# Patient Record
Sex: FEMALE | Race: WHITE | Employment: UNEMPLOYED | URBAN - METROPOLITAN AREA
[De-identification: names, ages, dates, MRNs, and addresses within clinical notes are randomized per-mention and may not be internally consistent; named-entity substitution may affect disease eponyms.]

---

## 2017-03-27 ENCOUNTER — HOSPITAL ENCOUNTER (OUTPATIENT)
Dept: WOMENS IMAGING | Age: 47
Discharge: HOME | End: 2017-03-27
Payer: COMMERCIAL

## 2017-03-27 DIAGNOSIS — Z12.39 BREAST SCREENING: ICD-10-CM

## 2017-03-27 PROCEDURE — 77063 BREAST TOMOSYNTHESIS BI: CPT

## 2017-10-24 ENCOUNTER — OFFICE VISIT (OUTPATIENT)
Dept: PULMONOLOGY | Age: 47
End: 2017-10-24
Payer: COMMERCIAL

## 2017-10-24 VITALS
BODY MASS INDEX: 41.99 KG/M2 | SYSTOLIC BLOOD PRESSURE: 144 MMHG | DIASTOLIC BLOOD PRESSURE: 82 MMHG | WEIGHT: 237 LBS | RESPIRATION RATE: 20 BRPM | HEIGHT: 63 IN | OXYGEN SATURATION: 98 % | HEART RATE: 92 BPM

## 2017-10-24 DIAGNOSIS — G47.33 OBSTRUCTIVE SLEEP APNEA: Primary | ICD-10-CM

## 2017-10-24 PROCEDURE — G8417 CALC BMI ABV UP PARAM F/U: HCPCS | Performed by: INTERNAL MEDICINE

## 2017-10-24 PROCEDURE — G8427 DOCREV CUR MEDS BY ELIG CLIN: HCPCS | Performed by: INTERNAL MEDICINE

## 2017-10-24 PROCEDURE — 99204 OFFICE O/P NEW MOD 45 MIN: CPT | Performed by: INTERNAL MEDICINE

## 2017-10-24 PROCEDURE — G8484 FLU IMMUNIZE NO ADMIN: HCPCS | Performed by: INTERNAL MEDICINE

## 2017-10-24 PROCEDURE — 1036F TOBACCO NON-USER: CPT | Performed by: INTERNAL MEDICINE

## 2017-10-24 ASSESSMENT — SLEEP AND FATIGUE QUESTIONNAIRES
HOW LIKELY ARE YOU TO NOD OFF OR FALL ASLEEP WHILE SITTING AND TALKING TO SOMEONE: 0
HOW LIKELY ARE YOU TO NOD OFF OR FALL ASLEEP WHILE SITTING AND READING: 0
HOW LIKELY ARE YOU TO NOD OFF OR FALL ASLEEP IN A CAR, WHILE STOPPED FOR A FEW MINUTES IN TRAFFIC: 0
HOW LIKELY ARE YOU TO NOD OFF OR FALL ASLEEP WHILE SITTING QUIETLY AFTER LUNCH WITHOUT ALCOHOL: 0
ESS TOTAL SCORE: 0
HOW LIKELY ARE YOU TO NOD OFF OR FALL ASLEEP WHEN YOU ARE A PASSENGER IN A CAR FOR AN HOUR WITHOUT A BREAK: 0
HOW LIKELY ARE YOU TO NOD OFF OR FALL ASLEEP WHILE SITTING INACTIVE IN A PUBLIC PLACE: 0
HOW LIKELY ARE YOU TO NOD OFF OR FALL ASLEEP WHILE LYING DOWN TO REST IN THE AFTERNOON WHEN CIRCUMSTANCES PERMIT: 0
HOW LIKELY ARE YOU TO NOD OFF OR FALL ASLEEP WHILE WATCHING TV: 0

## 2017-10-24 NOTE — PROGRESS NOTES
OUTPATIENT PULMONARY CONSULT NOTE      Patient:  Kori Kaplan  MRN: [id number]    Consulting Physician: Dr Ashanti Workman  Reason for Consult: RACHAEL  Primacy Care Physician: Jace Murillo MD    HISTORY OF PRESENT ILLNESS:   The patient is a 49 y.o. female   She was diagnosed with colon cancer initially diagnosed in 2015 and had multiple surgeries and chemotherapy in 2015 with initial staging of 3 B and followed by oncology and on routine CT scan abdomen and chest showed RML mass/nodule in July or august this year, surgery done by DR Christa Gusman in [facility] and she had RML removed in august this year and was diagnosed stage 4 metastatic colon cancer and  RML lobectomy showed mets from colon  cancer,     Post surgery she was noted to have very loud snoring and dry mouth and possible ? witnessed apneas, sometime unrefresh sleep   Positive wt gain in last 1 year or so    Positive Snores at night, wakes herself snoring. Has witnessed apnea. Wakes up with choking and gasping sensation. Positive dry mouth upon awakening. NO Fatigue and tiredness during the day. No history of sleep apnea. Goes to sleep at 10 pm, wakes up 545 am. It takes 10 minutes to fall asleep. Wakes up 1-2 times at night to go to bathroom. . Takes no nap during the day. No headache in am. No car wrecks or near wrecks because of the sleepiness. No nodding off while driving. ++ weight gain. No forgetfulness or decreased concentration. No nasal congestion or obstruction at night. No significant caffienated drinks or alcohol. No leg jerks during sleep. No restless feelings in legs at night. No numbness or burning in leg or feet. No leg aches or cramps . No loss of muscle strength when angry or laugh. No hallucination when dozing off or waking up from sleep. No paralysis upon awakening from sleep or going to sleep. No teeth grinding, nightmares, sleep walking. No night time panic attacks.      Sleep Medicine 10/24/2017   Sitting and reading 0   Watching TV 0 Sitting, inactive in a public place (e.g. a theatre or a meeting) 0   As a passenger in a car for an hour without a break 1   Lying down to rest in the afternoon when circumstances permit 0   Sitting and talking to someone 0   Sitting quietly after a lunch without alcohol 0   In a car, while stopped for a few minutes in traffic 0   Total score 1     Past Medical History:        Diagnosis Date    Cancer [facility])     colon       Past Surgical History:        Procedure Laterality Date    ABDOMINAL EXPLORATION SURGERY      CHOLECYSTECTOMY      COLOSTOMY Left     ILEOSTOMY OR JEJUNOSTOMY Right     TUBAL LIGATION         Allergies: Allergies   Allergen Reactions    Augmentin [Amoxicillin-Pot Clavulanate] Hives         Home Meds:   Outpatient Encounter Prescriptions as of 10/24/2017   Medication Sig Dispense Refill    Pyridoxine HCl (VITAMIN B-6) 50 MG tablet Take 50 mg by mouth daily      metoprolol (TOPROL-XL) 25 MG XL tablet Take 25 mg by mouth daily       omeprazole (PRILOSEC) 20 MG capsule Take 20 mg by mouth daily       No facility-administered encounter medications on file as of 10/24/2017. Social History:   TOBACCO:   reports that she has quit smoking 4 years ago and smoked for 27 years 1 ppd. She does not have any smokeless tobacco history on file. ETOH:   reports that she does not drink alcohol. OCCUPATION:  [employer]     Family History:       Problem Relation Age of Onset    Diabetes Father     Heart Disease Maternal Grandmother     Heart Disease Maternal Grandfather        Immunizations: There is no immunization history on file for this patient.       REVIEW OF SYSTEMS:  CONSTITUTIONAL:  positive for  Weight gain  negative for  fevers, chills, sweats, malaise, anorexia and weight loss  EYES:  negative for  double vision, blurred vision, eye discharge, visual disturbance, irritation, redness and icterus  HEENT:  negative for  hearing loss, tinnitus, ear drainage, nasal congestion, No results found for: TSH  Urinalysis: No results found for: Orvan Miranda, CLARITYU, COLORU, PHUR, PROTEINU, RBCUA, SPECGRAV, BILIRUBINUR, NITRU, WBCUA, LEUKOCYTESUR, GLUCOSEU  Cultures:-  -----------------------------------------------------------------    ABGs: No results found for: PHART, PO2ART, GGA1EDT    Pulmonary Functions Testing Results:    No results found for: FEV1, FVC, XXK8TKB, TLC, DLCO    CXR    CT Scans      GI work up:-    Assessment and Plan       ICD-10-CM ICD-9-CM    1. Obstructive sleep apnea G47.33 327.23    2. Class 3 obesity with body mass index (BMI) of 40.0 to 44.9 in adult, unspecified obesity type, unspecified whether serious comorbidity present ([facility].) E66.9 278.00     Z68.41 V85.41        There is no problem list on file for this patient. Plan and recommendations:    Sleep study with CPAP if needed  Brochure on RACHAEL  Wt loss is recommended and discussed  Follow good sleep hygeine instructions  Given sleep hygeine instructions    Follow up with her oncology and regular follow up for colon cancer     RTC after sleep study. It was my pleasure to evaluate Malka Patel today. Please call with questions.     Jose Ansari MD             10/24/2017, 4:12 PM

## 2017-10-26 ENCOUNTER — HOSPITAL ENCOUNTER (OUTPATIENT)
Dept: SLEEP CENTER | Age: 47
Discharge: HOME | End: 2017-10-26
Payer: COMMERCIAL

## 2017-10-26 DIAGNOSIS — G47.33 OSA (OBSTRUCTIVE SLEEP APNEA): Primary | ICD-10-CM

## 2017-10-26 PROCEDURE — 95810 POLYSOM 6/> YRS 4/> PARAM: CPT

## 2017-10-27 VITALS
HEIGHT: 63 IN | RESPIRATION RATE: 20 BRPM | HEART RATE: 92 BPM | SYSTOLIC BLOOD PRESSURE: 144 MMHG | WEIGHT: 237 LBS | BODY MASS INDEX: 41.99 KG/M2 | DIASTOLIC BLOOD PRESSURE: 82 MMHG

## 2017-10-27 ASSESSMENT — SLEEP AND FATIGUE QUESTIONNAIRES
HOW LIKELY ARE YOU TO NOD OFF OR FALL ASLEEP WHILE SITTING AND READING: 0
ESS TOTAL SCORE: 1
HOW LIKELY ARE YOU TO NOD OFF OR FALL ASLEEP WHILE SITTING AND TALKING TO SOMEONE: 0
HOW LIKELY ARE YOU TO NOD OFF OR FALL ASLEEP WHILE LYING DOWN TO REST IN THE AFTERNOON WHEN CIRCUMSTANCES PERMIT: 0
NECK CIRCUMFERENCE (INCHES): 14.96
HOW LIKELY ARE YOU TO NOD OFF OR FALL ASLEEP IN A CAR, WHILE STOPPED FOR A FEW MINUTES IN TRAFFIC: 0
HOW LIKELY ARE YOU TO NOD OFF OR FALL ASLEEP WHILE WATCHING TV: 0
HOW LIKELY ARE YOU TO NOD OFF OR FALL ASLEEP WHILE SITTING INACTIVE IN A PUBLIC PLACE: 0
HOW LIKELY ARE YOU TO NOD OFF OR FALL ASLEEP WHEN YOU ARE A PASSENGER IN A CAR FOR AN HOUR WITHOUT A BREAK: 1
HOW LIKELY ARE YOU TO NOD OFF OR FALL ASLEEP WHILE SITTING QUIETLY AFTER LUNCH WITHOUT ALCOHOL: 0

## 2017-11-02 ENCOUNTER — HOSPITAL ENCOUNTER (OUTPATIENT)
Dept: SLEEP CENTER | Age: 47
Discharge: HOME | End: 2017-11-02
Payer: COMMERCIAL

## 2017-11-02 DIAGNOSIS — G47.33 OBSTRUCTIVE SLEEP APNEA: ICD-10-CM

## 2017-11-02 PROCEDURE — 95811 POLYSOM 6/>YRS CPAP 4/> PARM: CPT

## 2017-11-03 VITALS
WEIGHT: 237 LBS | HEART RATE: 88 BPM | DIASTOLIC BLOOD PRESSURE: 84 MMHG | SYSTOLIC BLOOD PRESSURE: 117 MMHG | HEIGHT: 63 IN | RESPIRATION RATE: 16 BRPM | BODY MASS INDEX: 41.99 KG/M2

## 2017-11-15 ENCOUNTER — HOSPITAL ENCOUNTER (OUTPATIENT)
Dept: NUCLEAR MEDICINE | Age: 47
Discharge: HOME | End: 2017-11-15
Payer: COMMERCIAL

## 2017-11-15 DIAGNOSIS — C18.5 MALIGNANT NEOPLASM OF SPLENIC FLEXURE (HCC): ICD-10-CM

## 2017-11-15 PROCEDURE — 3430000000 HC RX DIAGNOSTIC RADIOPHARMACEUTICAL: Performed by: INTERNAL MEDICINE

## 2017-11-15 PROCEDURE — A9552 F18 FDG: HCPCS | Performed by: INTERNAL MEDICINE

## 2017-11-15 PROCEDURE — 78815 PET IMAGE W/CT SKULL-THIGH: CPT

## 2017-11-15 RX ADMIN — FLUDEOXYGLUCOSE F 18 15.94 MILLICURIE: 200 INJECTION, SOLUTION INTRAVENOUS at 17:15

## 2017-11-16 RX ORDER — FLUDEOXYGLUCOSE F 18 200 MCI/ML
15.94 INJECTION, SOLUTION INTRAVENOUS
Status: COMPLETED | OUTPATIENT
Start: 2017-11-16 | End: 2017-11-15

## 2017-11-21 DIAGNOSIS — G47.33 OBSTRUCTIVE SLEEP APNEA: ICD-10-CM

## 2018-01-05 ENCOUNTER — APPOINTMENT (OUTPATIENT)
Dept: GENERAL RADIOLOGY | Age: 48
End: 2018-01-05
Attending: SURGERY
Payer: COMMERCIAL

## 2018-01-05 ENCOUNTER — HOSPITAL ENCOUNTER (OUTPATIENT)
Age: 48
Setting detail: OUTPATIENT SURGERY
Discharge: HOME | End: 2018-01-05
Attending: SURGERY | Admitting: SURGERY
Payer: COMMERCIAL

## 2018-01-05 ENCOUNTER — ANESTHESIA EVENT (OUTPATIENT)
Dept: OPERATING ROOM | Age: 48
End: 2018-01-05
Payer: COMMERCIAL

## 2018-01-05 ENCOUNTER — ANESTHESIA (OUTPATIENT)
Dept: OPERATING ROOM | Age: 48
End: 2018-01-05
Payer: COMMERCIAL

## 2018-01-05 VITALS
RESPIRATION RATE: 16 BRPM | TEMPERATURE: 97.2 F | HEIGHT: 63 IN | OXYGEN SATURATION: 96 % | DIASTOLIC BLOOD PRESSURE: 78 MMHG | SYSTOLIC BLOOD PRESSURE: 121 MMHG | WEIGHT: 235 LBS | BODY MASS INDEX: 41.64 KG/M2 | HEART RATE: 89 BPM

## 2018-01-05 VITALS — DIASTOLIC BLOOD PRESSURE: 110 MMHG | OXYGEN SATURATION: 96 % | SYSTOLIC BLOOD PRESSURE: 167 MMHG

## 2018-01-05 DIAGNOSIS — C18.9 METASTATIC COLON CANCER IN FEMALE (HCC): Primary | ICD-10-CM

## 2018-01-05 PROCEDURE — A6257 TRANSPARENT FILM <= 16 SQ IN: HCPCS | Performed by: SURGERY

## 2018-01-05 PROCEDURE — 71045 X-RAY EXAM CHEST 1 VIEW: CPT

## 2018-01-05 PROCEDURE — C1788 PORT, INDWELLING, IMP: HCPCS | Performed by: SURGERY

## 2018-01-05 PROCEDURE — A6402 STERILE GAUZE <= 16 SQ IN: HCPCS | Performed by: SURGERY

## 2018-01-05 PROCEDURE — 7100000001 HC PACU RECOVERY - ADDTL 15 MIN: Performed by: SURGERY

## 2018-01-05 PROCEDURE — 7100000031 HC ASPR PHASE II RECOVERY - ADDTL 15 MIN: Performed by: SURGERY

## 2018-01-05 PROCEDURE — 6360000002 HC RX W HCPCS: Performed by: SURGERY

## 2018-01-05 PROCEDURE — A6258 TRANSPARENT FILM >16<=48 IN: HCPCS | Performed by: SURGERY

## 2018-01-05 PROCEDURE — 3600000002 HC SURGERY LEVEL 2 BASE: Performed by: SURGERY

## 2018-01-05 PROCEDURE — 6360000002 HC RX W HCPCS: Performed by: NURSE ANESTHETIST, CERTIFIED REGISTERED

## 2018-01-05 PROCEDURE — 3700000001 HC ADD 15 MINUTES (ANESTHESIA): Performed by: SURGERY

## 2018-01-05 PROCEDURE — 3209999900 FLUORO FOR SURGICAL PROCEDURES

## 2018-01-05 PROCEDURE — 6360000002 HC RX W HCPCS: Performed by: ANESTHESIOLOGY

## 2018-01-05 PROCEDURE — 3600000012 HC SURGERY LEVEL 2 ADDTL 15MIN: Performed by: SURGERY

## 2018-01-05 PROCEDURE — 6370000000 HC RX 637 (ALT 250 FOR IP): Performed by: ANESTHESIOLOGY

## 2018-01-05 PROCEDURE — 2500000003 HC RX 250 WO HCPCS: Performed by: NURSE ANESTHETIST, CERTIFIED REGISTERED

## 2018-01-05 PROCEDURE — 3700000000 HC ANESTHESIA ATTENDED CARE: Performed by: SURGERY

## 2018-01-05 PROCEDURE — 2580000003 HC RX 258: Performed by: SURGERY

## 2018-01-05 PROCEDURE — 7100000010 HC PHASE II RECOVERY - FIRST 15 MIN: Performed by: SURGERY

## 2018-01-05 PROCEDURE — 7100000000 HC PACU RECOVERY - FIRST 15 MIN: Performed by: SURGERY

## 2018-01-05 PROCEDURE — 7100000011 HC PHASE II RECOVERY - ADDTL 15 MIN: Performed by: SURGERY

## 2018-01-05 PROCEDURE — 7100000030 HC ASPR PHASE II RECOVERY - FIRST 15 MIN: Performed by: SURGERY

## 2018-01-05 PROCEDURE — 2500000003 HC RX 250 WO HCPCS: Performed by: SURGERY

## 2018-01-05 DEVICE — PORT IMPL INFUSION 16X26 MM PWR INJ POLYURETHANE CATH XCELA: Type: IMPLANTABLE DEVICE | Status: FUNCTIONAL

## 2018-01-05 RX ORDER — BUPIVACAINE HYDROCHLORIDE 5 MG/ML
INJECTION, SOLUTION EPIDURAL; INTRACAUDAL PRN
Status: DISCONTINUED | OUTPATIENT
Start: 2018-01-05 | End: 2018-01-05 | Stop reason: HOSPADM

## 2018-01-05 RX ORDER — MIDAZOLAM HYDROCHLORIDE 1 MG/ML
INJECTION INTRAMUSCULAR; INTRAVENOUS PRN
Status: DISCONTINUED | OUTPATIENT
Start: 2018-01-05 | End: 2018-01-05 | Stop reason: SDUPTHER

## 2018-01-05 RX ORDER — MORPHINE SULFATE 8 MG/ML
2 INJECTION, SOLUTION INTRAMUSCULAR; INTRAVENOUS EVERY 5 MIN PRN
Status: DISCONTINUED | OUTPATIENT
Start: 2018-01-05 | End: 2018-01-05 | Stop reason: HOSPADM

## 2018-01-05 RX ORDER — HYDROCODONE BITARTRATE AND ACETAMINOPHEN 5; 325 MG/1; MG/1
TABLET ORAL
Qty: 30 TABLET | Refills: 0 | Status: SHIPPED | OUTPATIENT
Start: 2018-01-05 | End: 2018-01-12

## 2018-01-05 RX ORDER — FENTANYL CITRATE 50 UG/ML
INJECTION, SOLUTION INTRAMUSCULAR; INTRAVENOUS PRN
Status: DISCONTINUED | OUTPATIENT
Start: 2018-01-05 | End: 2018-01-05 | Stop reason: SDUPTHER

## 2018-01-05 RX ORDER — DEXAMETHASONE SODIUM PHOSPHATE 4 MG/ML
INJECTION, SOLUTION INTRA-ARTICULAR; INTRALESIONAL; INTRAMUSCULAR; INTRAVENOUS; SOFT TISSUE PRN
Status: DISCONTINUED | OUTPATIENT
Start: 2018-01-05 | End: 2018-01-05 | Stop reason: SDUPTHER

## 2018-01-05 RX ORDER — SODIUM CHLORIDE, SODIUM LACTATE, POTASSIUM CHLORIDE, CALCIUM CHLORIDE 600; 310; 30; 20 MG/100ML; MG/100ML; MG/100ML; MG/100ML
INJECTION, SOLUTION INTRAVENOUS CONTINUOUS
Status: DISCONTINUED | OUTPATIENT
Start: 2018-01-05 | End: 2018-01-05 | Stop reason: HOSPADM

## 2018-01-05 RX ORDER — HEPARIN SODIUM 1000 [USP'U]/ML
INJECTION, SOLUTION INTRAVENOUS; SUBCUTANEOUS PRN
Status: DISCONTINUED | OUTPATIENT
Start: 2018-01-05 | End: 2018-01-05 | Stop reason: HOSPADM

## 2018-01-05 RX ORDER — DIPHENHYDRAMINE HYDROCHLORIDE 50 MG/ML
12.5 INJECTION INTRAMUSCULAR; INTRAVENOUS
Status: DISCONTINUED | OUTPATIENT
Start: 2018-01-05 | End: 2018-01-05 | Stop reason: HOSPADM

## 2018-01-05 RX ORDER — KETOROLAC TROMETHAMINE 30 MG/ML
INJECTION, SOLUTION INTRAMUSCULAR; INTRAVENOUS PRN
Status: DISCONTINUED | OUTPATIENT
Start: 2018-01-05 | End: 2018-01-05 | Stop reason: SDUPTHER

## 2018-01-05 RX ORDER — ONDANSETRON 2 MG/ML
4 INJECTION INTRAMUSCULAR; INTRAVENOUS
Status: COMPLETED | OUTPATIENT
Start: 2018-01-05 | End: 2018-01-05

## 2018-01-05 RX ORDER — HYDROCODONE BITARTRATE AND ACETAMINOPHEN 5; 325 MG/1; MG/1
2 TABLET ORAL ONCE
Status: COMPLETED | OUTPATIENT
Start: 2018-01-05 | End: 2018-01-05

## 2018-01-05 RX ORDER — LIDOCAINE HYDROCHLORIDE 10 MG/ML
INJECTION, SOLUTION EPIDURAL; INFILTRATION; INTRACAUDAL; PERINEURAL PRN
Status: DISCONTINUED | OUTPATIENT
Start: 2018-01-05 | End: 2018-01-05 | Stop reason: SDUPTHER

## 2018-01-05 RX ORDER — CEFAZOLIN SODIUM 1 G/3ML
INJECTION, POWDER, FOR SOLUTION INTRAMUSCULAR; INTRAVENOUS PRN
Status: DISCONTINUED | OUTPATIENT
Start: 2018-01-05 | End: 2018-01-05

## 2018-01-05 RX ORDER — PROPOFOL 10 MG/ML
INJECTION, EMULSION INTRAVENOUS PRN
Status: DISCONTINUED | OUTPATIENT
Start: 2018-01-05 | End: 2018-01-05 | Stop reason: SDUPTHER

## 2018-01-05 RX ADMIN — ONDANSETRON 4 MG: 2 INJECTION INTRAMUSCULAR; INTRAVENOUS at 14:08

## 2018-01-05 RX ADMIN — FENTANYL CITRATE 50 MCG: 50 INJECTION, SOLUTION INTRAMUSCULAR; INTRAVENOUS at 12:40

## 2018-01-05 RX ADMIN — DEXAMETHASONE SODIUM PHOSPHATE 4 MG: 4 INJECTION, SOLUTION INTRAMUSCULAR; INTRAVENOUS at 12:40

## 2018-01-05 RX ADMIN — PROPOFOL 100 MG: 10 INJECTION, EMULSION INTRAVENOUS at 12:40

## 2018-01-05 RX ADMIN — SODIUM CHLORIDE, POTASSIUM CHLORIDE, SODIUM LACTATE AND CALCIUM CHLORIDE: 600; 310; 30; 20 INJECTION, SOLUTION INTRAVENOUS at 11:42

## 2018-01-05 RX ADMIN — CEFAZOLIN 2 G: 1 INJECTION, POWDER, FOR SOLUTION INTRAMUSCULAR; INTRAVENOUS at 12:40

## 2018-01-05 RX ADMIN — LIDOCAINE HYDROCHLORIDE 100 MG: 10 INJECTION, SOLUTION EPIDURAL; INFILTRATION; INTRACAUDAL; PERINEURAL at 12:40

## 2018-01-05 RX ADMIN — SODIUM CHLORIDE, POTASSIUM CHLORIDE, SODIUM LACTATE AND CALCIUM CHLORIDE: 600; 310; 30; 20 INJECTION, SOLUTION INTRAVENOUS at 12:35

## 2018-01-05 RX ADMIN — KETOROLAC TROMETHAMINE 30 MG: 30 INJECTION, SOLUTION INTRAMUSCULAR at 14:09

## 2018-01-05 RX ADMIN — HYDROCODONE BITARTRATE AND ACETAMINOPHEN 2 TABLET: 5; 325 TABLET ORAL at 16:05

## 2018-01-05 RX ADMIN — PROPOFOL 150 MG: 10 INJECTION, EMULSION INTRAVENOUS at 12:57

## 2018-01-05 RX ADMIN — PROPOFOL 100 MG: 10 INJECTION, EMULSION INTRAVENOUS at 13:52

## 2018-01-05 RX ADMIN — FENTANYL CITRATE 50 MCG: 50 INJECTION, SOLUTION INTRAMUSCULAR; INTRAVENOUS at 13:52

## 2018-01-05 RX ADMIN — FENTANYL CITRATE 50 MCG: 50 INJECTION, SOLUTION INTRAMUSCULAR; INTRAVENOUS at 12:43

## 2018-01-05 RX ADMIN — FENTANYL CITRATE 50 MCG: 50 INJECTION, SOLUTION INTRAMUSCULAR; INTRAVENOUS at 13:30

## 2018-01-05 RX ADMIN — SODIUM CHLORIDE, POTASSIUM CHLORIDE, SODIUM LACTATE AND CALCIUM CHLORIDE: 600; 310; 30; 20 INJECTION, SOLUTION INTRAVENOUS at 14:58

## 2018-01-05 RX ADMIN — MIDAZOLAM 2 MG: 1 INJECTION INTRAMUSCULAR; INTRAVENOUS at 12:40

## 2018-01-05 ASSESSMENT — PULMONARY FUNCTION TESTS
PIF_VALUE: 20
PIF_VALUE: 21
PIF_VALUE: 0
PIF_VALUE: 15
PIF_VALUE: 14
PIF_VALUE: 17
PIF_VALUE: 3
PIF_VALUE: 0
PIF_VALUE: 16
PIF_VALUE: 3
PIF_VALUE: 3
PIF_VALUE: 15
PIF_VALUE: 16
PIF_VALUE: 22
PIF_VALUE: 0
PIF_VALUE: 15
PIF_VALUE: 1
PIF_VALUE: 16
PIF_VALUE: 16
PIF_VALUE: 3
PIF_VALUE: 3
PIF_VALUE: 0
PIF_VALUE: 3
PIF_VALUE: 18
PIF_VALUE: 1
PIF_VALUE: 3
PIF_VALUE: 3
PIF_VALUE: 22
PIF_VALUE: 2
PIF_VALUE: 0
PIF_VALUE: 2
PIF_VALUE: 3
PIF_VALUE: 3
PIF_VALUE: 0
PIF_VALUE: 18
PIF_VALUE: 3
PIF_VALUE: 4
PIF_VALUE: 17
PIF_VALUE: 17
PIF_VALUE: 16
PIF_VALUE: 18
PIF_VALUE: 3
PIF_VALUE: 16
PIF_VALUE: 23
PIF_VALUE: 3
PIF_VALUE: 3
PIF_VALUE: 7
PIF_VALUE: 4
PIF_VALUE: 0
PIF_VALUE: 3
PIF_VALUE: 2
PIF_VALUE: 1
PIF_VALUE: 15
PIF_VALUE: 22
PIF_VALUE: 18
PIF_VALUE: 0
PIF_VALUE: 19
PIF_VALUE: 0
PIF_VALUE: 0
PIF_VALUE: 13
PIF_VALUE: 17
PIF_VALUE: 18
PIF_VALUE: 4
PIF_VALUE: 3
PIF_VALUE: 2
PIF_VALUE: 7
PIF_VALUE: 17
PIF_VALUE: 15
PIF_VALUE: 16
PIF_VALUE: 3
PIF_VALUE: 2
PIF_VALUE: 17
PIF_VALUE: 3
PIF_VALUE: 16
PIF_VALUE: 17
PIF_VALUE: 0
PIF_VALUE: 15
PIF_VALUE: 4
PIF_VALUE: 1
PIF_VALUE: 18
PIF_VALUE: 1
PIF_VALUE: 0
PIF_VALUE: 20
PIF_VALUE: 3
PIF_VALUE: 3
PIF_VALUE: 2
PIF_VALUE: 19
PIF_VALUE: 16
PIF_VALUE: 3
PIF_VALUE: 1
PIF_VALUE: 0
PIF_VALUE: 14
PIF_VALUE: 0
PIF_VALUE: 16
PIF_VALUE: 0
PIF_VALUE: 18
PIF_VALUE: 3
PIF_VALUE: 2
PIF_VALUE: 2

## 2018-01-05 ASSESSMENT — PAIN DESCRIPTION - LOCATION
LOCATION: NECK

## 2018-01-05 ASSESSMENT — PAIN DESCRIPTION - PROGRESSION
CLINICAL_PROGRESSION: GRADUALLY IMPROVING
CLINICAL_PROGRESSION: NOT CHANGED
CLINICAL_PROGRESSION: GRADUALLY WORSENING

## 2018-01-05 ASSESSMENT — PAIN DESCRIPTION - PAIN TYPE
TYPE: SURGICAL PAIN

## 2018-01-05 ASSESSMENT — PAIN DESCRIPTION - DESCRIPTORS
DESCRIPTORS: ACHING;CONSTANT
DESCRIPTORS: ACHING;DISCOMFORT
DESCRIPTORS: ACHING;CONSTANT
DESCRIPTORS: ACHING;CONSTANT

## 2018-01-05 ASSESSMENT — PAIN DESCRIPTION - FREQUENCY
FREQUENCY: CONTINUOUS
FREQUENCY: INTERMITTENT

## 2018-01-05 ASSESSMENT — PAIN SCALES - GENERAL
PAINLEVEL_OUTOF10: 4
PAINLEVEL_OUTOF10: 9
PAINLEVEL_OUTOF10: 9
PAINLEVEL_OUTOF10: 0
PAINLEVEL_OUTOF10: 6
PAINLEVEL_OUTOF10: 9

## 2018-01-05 ASSESSMENT — PAIN DESCRIPTION - ORIENTATION
ORIENTATION: RIGHT

## 2018-01-05 ASSESSMENT — PAIN - FUNCTIONAL ASSESSMENT: PAIN_FUNCTIONAL_ASSESSMENT: 0-10

## 2018-01-05 ASSESSMENT — ENCOUNTER SYMPTOMS: STRIDOR: 0

## 2018-01-05 ASSESSMENT — PAIN DESCRIPTION - ONSET
ONSET: AWAKENED FROM SLEEP

## 2018-01-05 NOTE — ANESTHESIA PRE PROCEDURE
11/03/17 237 lb (107.5 kg)   10/27/17 237 lb (107.5 kg)     Body mass index is 41.63 kg/m². CBC:   Lab Results   Component Value Date    WBC 7.1 07/06/2015    RBC 4.71 07/06/2015    HGB 12.0 07/06/2015    HCT 38.6 07/06/2015    MCV 81.9 07/06/2015    RDW 18.2 07/06/2015     07/06/2015       CMP:   Lab Results   Component Value Date     07/06/2015    K 4.2 07/06/2015     07/06/2015    CO2 25 07/06/2015    BUN 9 07/06/2015    CREATININE 0.56 07/06/2015    GFRAA >60 07/06/2015    LABGLOM >60 07/06/2015    GLUCOSE 110 07/06/2015    CALCIUM 9.0 07/06/2015       POC Tests: No results for input(s): POCGLU, POCNA, POCK, POCCL, POCBUN, POCHEMO, POCHCT in the last 72 hours. Coags: No results found for: PROTIME, INR, APTT    HCG (If Applicable):   Lab Results   Component Value Date    PREGTESTUR NEGATIVE 07/06/2015        ABGs: No results found for: PHART, PO2ART, HTJ1QGT, KTK3IRM, BEART, E8XUBGWG     Type & Screen (If Applicable):  No results found for: LABABO, [address]    Anesthesia Evaluation  Patient summary reviewed  Airway: Mallampati: II  TM distance: >3 FB   Neck ROM: full  Mouth opening: > = 3 FB Dental:    (+) upper dentures and partials      Pulmonary:Negative Pulmonary ROS and normal exam        (-) rhonchi, wheezes, rales and stridor                           Cardiovascular:Negative CV ROS        (-) murmur,  friction rub, carotid bruit and peripheral edema      Rhythm: regular  Rate: normal                    Neuro/Psych:   Negative Neuro/Psych ROS              GI/Hepatic/Renal: Neg GI/Hepatic/Renal ROS            Endo/Other: Negative Endo/Other ROS                    Abdominal:       Abdomen: soft. Vascular: negative vascular ROS. Anesthesia Plan      MAC and general     ASA 2       Induction: intravenous. MIPS: Postoperative opioids intended and Prophylactic antiemetics administered. Anesthetic plan and risks discussed with patient.       Plan discussed with CRNA.                   Harvy Leyden, MD   1/5/2018

## 2018-01-10 DIAGNOSIS — C78.00 COLON CANCER METASTASIZED TO LUNG (HCC): ICD-10-CM

## 2018-01-10 DIAGNOSIS — C18.9 COLON CANCER METASTASIZED TO LUNG (HCC): ICD-10-CM

## 2018-01-10 RX ORDER — METHYLPREDNISOLONE SODIUM SUCCINATE 125 MG/2ML
125 INJECTION, POWDER, LYOPHILIZED, FOR SOLUTION INTRAMUSCULAR; INTRAVENOUS ONCE
Status: CANCELLED | OUTPATIENT
Start: 2018-01-10 | End: 2018-01-10

## 2018-01-10 RX ORDER — SODIUM CHLORIDE 9 MG/ML
INJECTION, SOLUTION INTRAVENOUS CONTINUOUS
Status: CANCELLED | OUTPATIENT
Start: 2018-01-10

## 2018-01-10 RX ORDER — SODIUM CHLORIDE 0.9 % (FLUSH) 0.9 %
10 SYRINGE (ML) INJECTION PRN
Status: CANCELLED | OUTPATIENT
Start: 2018-01-10

## 2018-01-10 RX ORDER — DEXTROSE MONOHYDRATE 50 MG/ML
INJECTION, SOLUTION INTRAVENOUS ONCE
Status: CANCELLED | OUTPATIENT
Start: 2018-01-10 | End: 2018-01-10

## 2018-01-10 RX ORDER — 0.9 % SODIUM CHLORIDE 0.9 %
10 VIAL (ML) INJECTION ONCE
Status: CANCELLED | OUTPATIENT
Start: 2018-01-10 | End: 2018-01-10

## 2018-01-10 RX ORDER — FLUOROURACIL 50 MG/ML
400 INJECTION, SOLUTION INTRAVENOUS ONCE
Status: CANCELLED | OUTPATIENT
Start: 2018-01-10

## 2018-01-10 RX ORDER — SODIUM CHLORIDE 0.9 % (FLUSH) 0.9 %
5 SYRINGE (ML) INJECTION PRN
Status: CANCELLED | OUTPATIENT
Start: 2018-01-10

## 2018-01-10 RX ORDER — SODIUM CHLORIDE 9 MG/ML
INJECTION, SOLUTION INTRAVENOUS ONCE
Status: CANCELLED | OUTPATIENT
Start: 2018-01-10 | End: 2018-01-10

## 2018-01-10 RX ORDER — PALONOSETRON 0.05 MG/ML
0.25 INJECTION, SOLUTION INTRAVENOUS ONCE
Status: CANCELLED | OUTPATIENT
Start: 2018-01-10

## 2018-01-10 RX ORDER — DIPHENHYDRAMINE HYDROCHLORIDE 50 MG/ML
50 INJECTION INTRAMUSCULAR; INTRAVENOUS ONCE
Status: CANCELLED | OUTPATIENT
Start: 2018-01-10 | End: 2018-01-10

## 2018-01-10 RX ORDER — HEPARIN SODIUM (PORCINE) LOCK FLUSH IV SOLN 100 UNIT/ML 100 UNIT/ML
500 SOLUTION INTRAVENOUS PRN
Status: CANCELLED | OUTPATIENT
Start: 2018-01-10

## 2018-01-10 RX ORDER — ATROPINE SULFATE 0.4 MG/ML
0.4 AMPUL (ML) INJECTION
Status: CANCELLED | OUTPATIENT
Start: 2018-01-10

## 2018-01-23 ENCOUNTER — TELEPHONE (OUTPATIENT)
Dept: INFUSION THERAPY | Age: 48
End: 2018-01-23

## 2018-01-26 RX ORDER — FLUOROURACIL 50 MG/ML
850 INJECTION, SOLUTION INTRAVENOUS ONCE
Status: CANCELLED | OUTPATIENT
Start: 2018-01-26

## 2018-01-26 RX ORDER — DEXAMETHASONE SODIUM PHOSPHATE 10 MG/ML
10 INJECTION INTRAMUSCULAR; INTRAVENOUS ONCE
Status: CANCELLED | OUTPATIENT
Start: 2018-01-26

## 2018-12-05 ENCOUNTER — ANESTHESIA EVENT (OUTPATIENT)
Dept: OPERATING ROOM | Age: 48
End: 2018-12-05
Payer: COMMERCIAL

## 2018-12-05 ENCOUNTER — HOSPITAL ENCOUNTER (OUTPATIENT)
Age: 48
Setting detail: OUTPATIENT SURGERY
Discharge: HOME | End: 2018-12-05
Attending: SURGERY | Admitting: SURGERY
Payer: COMMERCIAL

## 2018-12-05 ENCOUNTER — ANESTHESIA (OUTPATIENT)
Dept: OPERATING ROOM | Age: 48
End: 2018-12-05
Payer: COMMERCIAL

## 2018-12-05 VITALS — DIASTOLIC BLOOD PRESSURE: 64 MMHG | SYSTOLIC BLOOD PRESSURE: 103 MMHG | OXYGEN SATURATION: 97 %

## 2018-12-05 VITALS
BODY MASS INDEX: 41.64 KG/M2 | DIASTOLIC BLOOD PRESSURE: 75 MMHG | RESPIRATION RATE: 16 BRPM | OXYGEN SATURATION: 98 % | WEIGHT: 235 LBS | SYSTOLIC BLOOD PRESSURE: 121 MMHG | HEART RATE: 84 BPM | HEIGHT: 63 IN | TEMPERATURE: 98.1 F

## 2018-12-05 PROCEDURE — 7100000011 HC PHASE II RECOVERY - ADDTL 15 MIN: Performed by: SURGERY

## 2018-12-05 PROCEDURE — 3700000000 HC ANESTHESIA ATTENDED CARE: Performed by: SURGERY

## 2018-12-05 PROCEDURE — 7100000001 HC PACU RECOVERY - ADDTL 15 MIN: Performed by: SURGERY

## 2018-12-05 PROCEDURE — 2580000003 HC RX 258: Performed by: ANESTHESIOLOGY

## 2018-12-05 PROCEDURE — C1773 RET DEV, INSERTABLE: HCPCS | Performed by: SURGERY

## 2018-12-05 PROCEDURE — 7100000030 HC ASPR PHASE II RECOVERY - FIRST 15 MIN: Performed by: SURGERY

## 2018-12-05 PROCEDURE — 2500000003 HC RX 250 WO HCPCS: Performed by: ANESTHESIOLOGY

## 2018-12-05 PROCEDURE — 3700000001 HC ADD 15 MINUTES (ANESTHESIA): Performed by: SURGERY

## 2018-12-05 PROCEDURE — 2709999900 HC NON-CHARGEABLE SUPPLY: Performed by: SURGERY

## 2018-12-05 PROCEDURE — 7100000000 HC PACU RECOVERY - FIRST 15 MIN: Performed by: SURGERY

## 2018-12-05 PROCEDURE — 3609010300 HC COLONOSCOPY W/BIOPSY SINGLE/MULTIPLE: Performed by: SURGERY

## 2018-12-05 PROCEDURE — 6360000002 HC RX W HCPCS: Performed by: ANESTHESIOLOGY

## 2018-12-05 PROCEDURE — 88305 TISSUE EXAM BY PATHOLOGIST: CPT

## 2018-12-05 PROCEDURE — 7100000031 HC ASPR PHASE II RECOVERY - ADDTL 15 MIN: Performed by: SURGERY

## 2018-12-05 PROCEDURE — 2580000003 HC RX 258: Performed by: SURGERY

## 2018-12-05 PROCEDURE — 7100000010 HC PHASE II RECOVERY - FIRST 15 MIN: Performed by: SURGERY

## 2018-12-05 RX ORDER — SODIUM CHLORIDE, SODIUM LACTATE, POTASSIUM CHLORIDE, CALCIUM CHLORIDE 600; 310; 30; 20 MG/100ML; MG/100ML; MG/100ML; MG/100ML
INJECTION, SOLUTION INTRAVENOUS CONTINUOUS PRN
Status: DISCONTINUED | OUTPATIENT
Start: 2018-12-05 | End: 2018-12-05 | Stop reason: SDUPTHER

## 2018-12-05 RX ORDER — MORPHINE SULFATE 2 MG/ML
2 INJECTION, SOLUTION INTRAMUSCULAR; INTRAVENOUS EVERY 5 MIN PRN
Status: DISCONTINUED | OUTPATIENT
Start: 2018-12-05 | End: 2018-12-05 | Stop reason: HOSPADM

## 2018-12-05 RX ORDER — PROMETHAZINE HYDROCHLORIDE 25 MG/ML
6.25 INJECTION, SOLUTION INTRAMUSCULAR; INTRAVENOUS
Status: DISCONTINUED | OUTPATIENT
Start: 2018-12-05 | End: 2018-12-05 | Stop reason: HOSPADM

## 2018-12-05 RX ORDER — FENTANYL CITRATE 50 UG/ML
25 INJECTION, SOLUTION INTRAMUSCULAR; INTRAVENOUS EVERY 5 MIN PRN
Status: DISCONTINUED | OUTPATIENT
Start: 2018-12-05 | End: 2018-12-05 | Stop reason: HOSPADM

## 2018-12-05 RX ORDER — HYDROCODONE BITARTRATE AND ACETAMINOPHEN 5; 325 MG/1; MG/1
2 TABLET ORAL PRN
Status: DISCONTINUED | OUTPATIENT
Start: 2018-12-05 | End: 2018-12-05 | Stop reason: HOSPADM

## 2018-12-05 RX ORDER — HYDROCODONE BITARTRATE AND ACETAMINOPHEN 5; 325 MG/1; MG/1
1 TABLET ORAL PRN
Status: DISCONTINUED | OUTPATIENT
Start: 2018-12-05 | End: 2018-12-05 | Stop reason: HOSPADM

## 2018-12-05 RX ORDER — MEPERIDINE HYDROCHLORIDE 50 MG/ML
12.5 INJECTION INTRAMUSCULAR; INTRAVENOUS; SUBCUTANEOUS EVERY 5 MIN PRN
Status: DISCONTINUED | OUTPATIENT
Start: 2018-12-05 | End: 2018-12-05 | Stop reason: HOSPADM

## 2018-12-05 RX ORDER — SODIUM CHLORIDE, SODIUM LACTATE, POTASSIUM CHLORIDE, CALCIUM CHLORIDE 600; 310; 30; 20 MG/100ML; MG/100ML; MG/100ML; MG/100ML
INJECTION, SOLUTION INTRAVENOUS CONTINUOUS
Status: DISCONTINUED | OUTPATIENT
Start: 2018-12-05 | End: 2018-12-05 | Stop reason: HOSPADM

## 2018-12-05 RX ORDER — 0.9 % SODIUM CHLORIDE 0.9 %
500 INTRAVENOUS SOLUTION INTRAVENOUS
Status: DISCONTINUED | OUTPATIENT
Start: 2018-12-05 | End: 2018-12-05 | Stop reason: HOSPADM

## 2018-12-05 RX ORDER — HYDRALAZINE HYDROCHLORIDE 20 MG/ML
5 INJECTION INTRAMUSCULAR; INTRAVENOUS EVERY 10 MIN PRN
Status: DISCONTINUED | OUTPATIENT
Start: 2018-12-05 | End: 2018-12-05 | Stop reason: HOSPADM

## 2018-12-05 RX ORDER — DIPHENHYDRAMINE HYDROCHLORIDE 50 MG/ML
12.5 INJECTION INTRAMUSCULAR; INTRAVENOUS
Status: DISCONTINUED | OUTPATIENT
Start: 2018-12-05 | End: 2018-12-05 | Stop reason: HOSPADM

## 2018-12-05 RX ORDER — PROPOFOL 10 MG/ML
INJECTION, EMULSION INTRAVENOUS PRN
Status: DISCONTINUED | OUTPATIENT
Start: 2018-12-05 | End: 2018-12-05 | Stop reason: SDUPTHER

## 2018-12-05 RX ORDER — LIDOCAINE HYDROCHLORIDE 10 MG/ML
INJECTION, SOLUTION INFILTRATION; PERINEURAL PRN
Status: DISCONTINUED | OUTPATIENT
Start: 2018-12-05 | End: 2018-12-05 | Stop reason: SDUPTHER

## 2018-12-05 RX ORDER — METOPROLOL SUCCINATE 25 MG/1
TABLET, EXTENDED RELEASE ORAL
COMMUNITY

## 2018-12-05 RX ADMIN — PROPOFOL 25 MG: 10 INJECTION, EMULSION INTRAVENOUS at 13:35

## 2018-12-05 RX ADMIN — PROPOFOL 25 MG: 10 INJECTION, EMULSION INTRAVENOUS at 13:25

## 2018-12-05 RX ADMIN — PROPOFOL 25 MG: 10 INJECTION, EMULSION INTRAVENOUS at 13:30

## 2018-12-05 RX ADMIN — SODIUM CHLORIDE, POTASSIUM CHLORIDE, SODIUM LACTATE AND CALCIUM CHLORIDE: 600; 310; 30; 20 INJECTION, SOLUTION INTRAVENOUS at 13:13

## 2018-12-05 RX ADMIN — PROPOFOL 100 MG: 10 INJECTION, EMULSION INTRAVENOUS at 13:19

## 2018-12-05 RX ADMIN — SODIUM CHLORIDE, POTASSIUM CHLORIDE, SODIUM LACTATE AND CALCIUM CHLORIDE: 600; 310; 30; 20 INJECTION, SOLUTION INTRAVENOUS at 12:03

## 2018-12-05 RX ADMIN — PROPOFOL 25 MG: 10 INJECTION, EMULSION INTRAVENOUS at 13:20

## 2018-12-05 RX ADMIN — LIDOCAINE HYDROCHLORIDE 25 MG: 10 INJECTION, SOLUTION INFILTRATION; PERINEURAL at 13:19

## 2018-12-05 ASSESSMENT — PULMONARY FUNCTION TESTS
PIF_VALUE: 0
PIF_VALUE: 1
PIF_VALUE: 0
PIF_VALUE: 1
PIF_VALUE: 0
PIF_VALUE: 1
PIF_VALUE: 0

## 2018-12-05 ASSESSMENT — PAIN SCALES - GENERAL
PAINLEVEL_OUTOF10: 0
PAINLEVEL_OUTOF10: 0

## 2018-12-05 NOTE — ANESTHESIA POSTPROCEDURE EVALUATION
Department of Anesthesiology  Postprocedure Note    Patient: Zari Lares  MRN: [id number]  YOB: 1970  Date of evaluation: 12/5/2018  Time:  3:22 PM     Procedure Summary     Date:  12/05/18 Room / Location:  [address] / [address]    Anesthesia Start:  7265 Anesthesia Stop:  5697    Procedure:  COLONOSCOPY WITH BIOPSY (N/A ) Diagnosis:  (HISTORY OF COLON CANCER  (PAT PER ANES ON ADMIT))    Surgeon:  Valentin Herbert DO Responsible Provider:  Melida Sen MD    Anesthesia Type:  MAC ASA Status:  3          Anesthesia Type: MAC    Dedra Phase I: Dedra Score: 10    Dedra Phase II:      Last vitals: Reviewed and per EMR flowsheets.        Anesthesia Post Evaluation    Comments: POST- ANESTHESIA EVALUATION       Pt Name: Zari Lares  MRN: [id number]  [place]: 1970  Date of evaluation: 12/5/2018  Time:  3:22 PM      /69   Pulse 76   Temp 98.1 °F (36.7 °C) (Temporal)   Resp 16   Ht 5' 3\" (1.6 m)   Wt 235 lb (106.6 kg)   SpO2 98%   BMI 41.63 kg/m²      Consciousness Level  Awake  Cardiopulmonary Status  Stable  Pain Adequately Treated YES  Nausea / Vomiting  NO  Adequate Hydration  YES  Anesthesia Related Complications NONE      Electronically signed by Esdras Mendieta MD on 12/5/2018 at 3:22 PM

## 2018-12-06 LAB — SURGICAL PATHOLOGY REPORT: NORMAL

## 2019-02-09 ENCOUNTER — HOSPITAL ENCOUNTER (OUTPATIENT)
Dept: WOMENS IMAGING | Age: 49
Discharge: HOME | End: 2019-02-11
Payer: COMMERCIAL

## 2019-02-09 DIAGNOSIS — Z12.31 VISIT FOR SCREENING MAMMOGRAM: ICD-10-CM

## 2019-02-09 PROCEDURE — 77063 BREAST TOMOSYNTHESIS BI: CPT

## 2019-06-24 ENCOUNTER — OFFICE VISIT (OUTPATIENT)
Dept: UROLOGY | Age: 49
End: 2019-06-24
Payer: COMMERCIAL

## 2019-06-24 VITALS
BODY MASS INDEX: 37.63 KG/M2 | HEIGHT: 63 IN | HEART RATE: 89 BPM | DIASTOLIC BLOOD PRESSURE: 84 MMHG | WEIGHT: 212.4 LBS | TEMPERATURE: 97.4 F | SYSTOLIC BLOOD PRESSURE: 129 MMHG

## 2019-06-24 DIAGNOSIS — R10.32 LEFT LOWER QUADRANT PAIN: ICD-10-CM

## 2019-06-24 DIAGNOSIS — R10.9 FLANK PAIN: Primary | ICD-10-CM

## 2019-06-24 DIAGNOSIS — N20.0 KIDNEY STONE: ICD-10-CM

## 2019-06-24 LAB
BILIRUBIN, POC: ABNORMAL
BLOOD URINE, POC: ABNORMAL
CLARITY, POC: ABNORMAL
COLOR, POC: ABNORMAL
GLUCOSE URINE, POC: ABNORMAL
KETONES, POC: ABNORMAL
LEUKOCYTE EST, POC: ABNORMAL
NITRITE, POC: ABNORMAL
PH, POC: ABNORMAL
PROTEIN, POC: 15
SPECIFIC GRAVITY, POC: ABNORMAL
UROBILINOGEN, POC: ABNORMAL

## 2019-06-24 PROCEDURE — G8417 CALC BMI ABV UP PARAM F/U: HCPCS | Performed by: UROLOGY

## 2019-06-24 PROCEDURE — 81002 URINALYSIS NONAUTO W/O SCOPE: CPT | Performed by: UROLOGY

## 2019-06-24 PROCEDURE — 99214 OFFICE O/P EST MOD 30 MIN: CPT | Performed by: UROLOGY

## 2019-06-24 PROCEDURE — G8427 DOCREV CUR MEDS BY ELIG CLIN: HCPCS | Performed by: UROLOGY

## 2019-06-24 PROCEDURE — 1036F TOBACCO NON-USER: CPT | Performed by: UROLOGY

## 2019-06-24 ASSESSMENT — ENCOUNTER SYMPTOMS
SHORTNESS OF BREATH: 0
NAUSEA: 0
VOMITING: 0
CONSTIPATION: 0
BACK PAIN: 0
EYE REDNESS: 0
EYE PAIN: 0
COUGH: 0
WHEEZING: 0
ABDOMINAL PAIN: 0
DIARRHEA: 0

## 2019-06-24 NOTE — PROGRESS NOTES
[facility]  [facility] - [address]  [address]  [address] [facility] [address]  Dept: [address] [facility] Urology Office Note - Established    Patient:  Reggie Noonan  YOB: 1970  Date: 6/24/2019    The patient is a 51 y.o. female whopresents today for evaluation of the following problems:   Chief Complaint   Patient presents with    Flank Pain       HPI  Pt has h/o stones. Had urs in 2016. Has not had any stones since. Developed left flank pain radiating to LLQ and left groin. Has no n/v.  Pain a bit worse. Not relieved or exacerbated with any factors. No voiding problems.       Summary of old records: N/A    Additional History: N/A    Procedures Today: N/A    Urinalysis today:  Results for POC orders placed in visit on 06/24/19   POCT Urinalysis no Micro   Result Value Ref Range    Color, UA Dark Yellow     Clarity, UA      Glucose, UA POC (NEG)     Bilirubin, UA      Ketones, UA      Spec Grav, UA      Blood, UA POC (POS)     pH, UA      Protein, UA POC 15 (POS)     Urobilinogen, UA      Leukocytes, UA (NEG)     Nitrite, UA (NEG)        Imaging Reviewed during this Office Visit: none  (results were independently reviewed by physician and radiology report verified)    AUA Symptom Score (6/24/2019):  INCOMPLETE EMPTYING: How often have you had the sensation of not emptying your bladder?: Not at all  FREQUENCY: How often do you have to urinate less than every two hours?: Not at all  INTERMITTENCY: How often have you found you stopped and started again several times when you urinated?: Not at all  URGENCY: How often have you found it difficult to postpone urination?: Less than 1 to 5 times  WEAK STREAM: How often have you had a weak urinary stream?: Not at all  STRAINING: How often have you had to strain to start  urination?: Not at all  @(5505)@  TOTAL I-PSS SCORE[de-identified] 2  How would you feel if you were to spend the rest of your life with your urinary condition?: Mostly Satisfied    Last BUN and creatinine:  Lab Results   Component Value Date    BUN 9 07/06/2015     Lab Results   Component Value Date    CREATININE 0.56 07/06/2015       Additional Lab/Culture results: none    PAST MEDICAL, FAMILY AND SOCIAL HISTORY UPDATE:  Past Medical History:   Diagnosis Date    Cancer [facility])     colon    Hx of ileostomy     reversal done June and september 2016     Past Surgical History:   Procedure Laterality Date    ABDOMINAL EXPLORATION SURGERY      CHOLECYSTECTOMY      COLONOSCOPY N/A 12/5/2018    COLONOSCOPY WITH BIOPSY [facility] Left     ILEOSTOMY OR JEJUNOSTOMY Right     [place] [facility] [address] [facility]  [facility] VAD W/SUBQ PORT AGE 5 YR/> Right 1/5/2018    PORT INSERTION  INTERNAL JUGULAR /CHEST WALL performed by Ruba Gibbons DO at [address] [place] [address]       Family History   Problem Relation Age of Onset    Diabetes Father     Heart Disease Maternal Grandmother     Heart Disease Maternal Grandfather      Outpatient Medications Marked as Taking for the 6/24/19 encounter (Office Visit) with Mervat Hannah MD   Medication Sig Dispense Refill    metoprolol succinate (TOPROL XL) 25 MG extended release tablet metoprolol succinate ER 25 mg tablet,extended release 24 hr        (All medications reviewed and updated by provider sincelast office visit or hospitalization)   Augmentin [amoxicillin-pot clavulanate]  Social History     Tobacco Use   Smoking Status Former Smoker   Smokeless Tobacco Never Used      (If patient a smoker, smoking cessation counseling offered)     Social History     Substance and Sexual Activity   Alcohol Use No       REVIEW OF SYSTEMS:  Review of Systems      Physical Exam:      Vitals:    06/24/19 1446   BP: 129/84   Pulse: 89   Temp: 97.4 °F (36.3 °C)     Body mass index is 37.62 kg/m². Patient is a 51 y.o. female in noacute distress and alert and oriented to person, place and time.   Physical Exam  Constitutional: Patient in no acute distress. Neuro: Alert andoriented to person, place and time. Psych: Mood normal, affect normal  Skin: No rash noted  HEENT: Head: Normocephalic and atraumatic  Conjunctivae and EOM are normal. Pupils are equal, round  Nose: Normal  Right External Ear: Normal; Left External Ear: Normal  Mouth: Mucosa Moist  Neck: Supple  Lungs: Respiratory effort is normal  Cardiovascular: Warm & Pink  Abdomen: Soft, non-tender, non-distended with no CVA,  No flank tenderness,  Or hepatosplenomegaly   Lymphatics: No palpable lymphadenopathy. Bladder non-tender and not distended. Musculoskeletal: Normalgait and station      Assessment and Plan      1. Flank pain    2. Kidney stone    3. Left lower quadrant pain           Plan:   kub and renal u/s to evaluate for left flank/ LLQ pain, history of stones  F/u after above     Return in about 1 week (around 7/1/2019). Prescriptions Ordered:  No orders of the defined types were placed in this encounter. Orders Placed:  Orders Placed This Encounter   Procedures    XR ABDOMEN (KUB) (SINGLE AP VIEW)     Standing Status:   Future     Standing Expiration Date:   6/24/2020     Order Specific Question:   Reason for exam:     Answer:   flank pain and h/o stones    US Renal Kidney     Standing Status:   Future     Standing Expiration Date:   6/18/2020     Order Specific Question:   Reason for exam:     Answer:   renal stones, flank pain    POCT Urinalysis no Micro            Rosa Jaime MD    Agree with the ROS entered by the MA.

## 2019-06-24 NOTE — PROGRESS NOTES
Review of Systems   Constitutional: Negative for appetite change, chills and fever. Eyes: Negative for pain, redness and visual disturbance. Respiratory: Negative for cough, shortness of breath and wheezing. Cardiovascular: Negative for chest pain and leg swelling. Gastrointestinal: Negative for abdominal pain, constipation, diarrhea, nausea and vomiting. Genitourinary: Positive for flank pain. Negative for difficulty urinating, dysuria, frequency, hematuria and urgency. Musculoskeletal: Negative for back pain, joint swelling and myalgias. Skin: Negative for rash and wound. Neurological: Negative for dizziness, tremors and numbness. Hematological: Does not bruise/bleed easily.

## 2019-06-26 ENCOUNTER — HOSPITAL ENCOUNTER (OUTPATIENT)
Age: 49
Discharge: HOME | End: 2019-06-28
Payer: COMMERCIAL

## 2019-06-26 ENCOUNTER — TELEPHONE (OUTPATIENT)
Dept: UROLOGY | Age: 49
End: 2019-06-26

## 2019-06-26 ENCOUNTER — HOSPITAL ENCOUNTER (OUTPATIENT)
Dept: ULTRASOUND IMAGING | Age: 49
Discharge: HOME | End: 2019-06-28
Payer: COMMERCIAL

## 2019-06-26 ENCOUNTER — HOSPITAL ENCOUNTER (OUTPATIENT)
Dept: GENERAL RADIOLOGY | Age: 49
Discharge: HOME | End: 2019-06-28
Payer: COMMERCIAL

## 2019-06-26 DIAGNOSIS — N20.0 KIDNEY STONE: ICD-10-CM

## 2019-06-26 DIAGNOSIS — R10.9 FLANK PAIN: ICD-10-CM

## 2019-06-26 PROCEDURE — 76775 US EXAM ABDO BACK WALL LIM: CPT

## 2019-06-26 PROCEDURE — 74018 RADEX ABDOMEN 1 VIEW: CPT

## 2019-06-26 NOTE — TELEPHONE ENCOUNTER
Contacted patient to reminder her of appt and labwork. Patient states that she has everything scheduled for this afternoon at [facility]. \  NW

## 2019-07-01 ENCOUNTER — OFFICE VISIT (OUTPATIENT)
Dept: UROLOGY | Age: 49
End: 2019-07-01
Payer: COMMERCIAL

## 2019-07-01 VITALS
TEMPERATURE: 97.9 F | HEART RATE: 86 BPM | SYSTOLIC BLOOD PRESSURE: 134 MMHG | BODY MASS INDEX: 37.39 KG/M2 | WEIGHT: 211 LBS | DIASTOLIC BLOOD PRESSURE: 84 MMHG | HEIGHT: 63 IN

## 2019-07-01 DIAGNOSIS — R10.32 LEFT LOWER QUADRANT PAIN: ICD-10-CM

## 2019-07-01 DIAGNOSIS — R10.9 FLANK PAIN: Primary | ICD-10-CM

## 2019-07-01 DIAGNOSIS — N20.0 KIDNEY STONE: ICD-10-CM

## 2019-07-01 PROCEDURE — 99214 OFFICE O/P EST MOD 30 MIN: CPT | Performed by: UROLOGY

## 2019-07-01 PROCEDURE — G8417 CALC BMI ABV UP PARAM F/U: HCPCS | Performed by: UROLOGY

## 2019-07-01 PROCEDURE — 1036F TOBACCO NON-USER: CPT | Performed by: UROLOGY

## 2019-07-01 PROCEDURE — G8427 DOCREV CUR MEDS BY ELIG CLIN: HCPCS | Performed by: UROLOGY

## 2019-07-01 RX ORDER — ACETAMINOPHEN 160 MG
1 TABLET,DISINTEGRATING ORAL DAILY
Refills: 3 | COMMUNITY
Start: 2019-06-28

## 2019-07-01 ASSESSMENT — ENCOUNTER SYMPTOMS
ABDOMINAL PAIN: 0
SHORTNESS OF BREATH: 0
EYE REDNESS: 0
BACK PAIN: 0
NAUSEA: 0
EYE PAIN: 0
COLOR CHANGE: 0
VOMITING: 0
COUGH: 0
WHEEZING: 0

## 2019-11-20 ENCOUNTER — HOSPITAL ENCOUNTER (OUTPATIENT)
Dept: WOUND CARE | Age: 49
Discharge: HOME | End: 2019-11-20
Payer: COMMERCIAL

## 2019-11-20 VITALS
HEIGHT: 63 IN | HEART RATE: 98 BPM | RESPIRATION RATE: 16 BRPM | WEIGHT: 189 LBS | DIASTOLIC BLOOD PRESSURE: 75 MMHG | TEMPERATURE: 97.4 F | BODY MASS INDEX: 33.49 KG/M2 | SYSTOLIC BLOOD PRESSURE: 127 MMHG

## 2019-11-20 DIAGNOSIS — E46 PROTEIN-CALORIE MALNUTRITION, UNSPECIFIED SEVERITY (HCC): ICD-10-CM

## 2019-11-20 DIAGNOSIS — T81.30XA ABDOMINAL WOUND DEHISCENCE, INITIAL ENCOUNTER: ICD-10-CM

## 2019-11-20 PROCEDURE — 11042 DBRDMT SUBQ TIS 1ST 20SQCM/<: CPT

## 2019-11-20 PROCEDURE — 99214 OFFICE O/P EST MOD 30 MIN: CPT

## 2019-11-20 PROCEDURE — 6370000000 HC RX 637 (ALT 250 FOR IP): Performed by: SURGERY

## 2019-11-20 RX ORDER — SPIRONOLACTONE 25 MG/1
25 TABLET ORAL DAILY
COMMUNITY

## 2019-11-20 RX ORDER — CIPROFLOXACIN 500 MG/1
500 TABLET, FILM COATED ORAL 2 TIMES DAILY
COMMUNITY
Start: 2019-11-17 | End: 2019-11-24

## 2019-11-20 RX ORDER — TRAMADOL HYDROCHLORIDE 50 MG/1
50 TABLET ORAL EVERY 8 HOURS PRN
COMMUNITY

## 2019-11-20 RX ORDER — DOXYCYCLINE HYCLATE 50 MG/1
324 CAPSULE, GELATIN COATED ORAL
COMMUNITY

## 2019-11-20 RX ORDER — OXYCODONE HYDROCHLORIDE AND ACETAMINOPHEN 5; 325 MG/1; MG/1
1 TABLET ORAL DAILY PRN
COMMUNITY

## 2019-11-20 RX ORDER — PROMETHAZINE HYDROCHLORIDE 12.5 MG/1
12.5 TABLET ORAL EVERY 6 HOURS PRN
COMMUNITY

## 2019-11-20 RX ORDER — METRONIDAZOLE 500 MG/1
500 TABLET ORAL 3 TIMES DAILY
COMMUNITY
Start: 2019-11-17 | End: 2019-11-24

## 2019-11-20 RX ORDER — LIDOCAINE HYDROCHLORIDE 40 MG/ML
SOLUTION TOPICAL ONCE
Status: COMPLETED | OUTPATIENT
Start: 2019-11-20 | End: 2019-11-20

## 2019-11-20 RX ORDER — LOPERAMIDE HYDROCHLORIDE 2 MG/1
2 CAPSULE ORAL
COMMUNITY

## 2019-11-20 RX ORDER — LORAZEPAM 0.5 MG/1
0.5 TABLET ORAL EVERY 8 HOURS PRN
COMMUNITY

## 2019-11-20 RX ORDER — LISINOPRIL 2.5 MG/1
2.5 TABLET ORAL DAILY
COMMUNITY

## 2019-11-20 RX ORDER — DIAZEPAM 2 MG/1
2 TABLET ORAL EVERY 8 HOURS PRN
COMMUNITY

## 2019-11-20 RX ADMIN — LIDOCAINE HYDROCHLORIDE: 40 SOLUTION TOPICAL at 09:03

## 2019-11-20 ASSESSMENT — PAIN SCALES - GENERAL: PAINLEVEL_OUTOF10: 0

## 2019-12-04 ENCOUNTER — HOSPITAL ENCOUNTER (OUTPATIENT)
Dept: WOUND CARE | Age: 49
Discharge: HOME | End: 2019-12-04
Payer: COMMERCIAL

## 2019-12-04 VITALS
BODY MASS INDEX: 28.35 KG/M2 | WEIGHT: 160 LBS | HEIGHT: 63 IN | HEART RATE: 122 BPM | TEMPERATURE: 96.7 F | RESPIRATION RATE: 16 BRPM

## 2019-12-04 PROCEDURE — 87205 SMEAR GRAM STAIN: CPT

## 2019-12-04 PROCEDURE — 87076 CULTURE ANAEROBE IDENT EACH: CPT

## 2019-12-04 PROCEDURE — 11043 DBRDMT MUSC&/FSCA 1ST 20/<: CPT

## 2019-12-04 PROCEDURE — 87070 CULTURE OTHR SPECIMN AEROBIC: CPT

## 2019-12-04 PROCEDURE — 87186 SC STD MICRODIL/AGAR DIL: CPT

## 2019-12-04 PROCEDURE — 87075 CULTR BACTERIA EXCEPT BLOOD: CPT

## 2019-12-04 PROCEDURE — 86403 PARTICLE AGGLUT ANTBDY SCRN: CPT

## 2019-12-04 RX ORDER — LIDOCAINE HYDROCHLORIDE 40 MG/ML
SOLUTION TOPICAL ONCE
Status: DISCONTINUED | OUTPATIENT
Start: 2019-12-04 | End: 2019-12-05 | Stop reason: HOSPADM

## 2019-12-04 ASSESSMENT — PAIN DESCRIPTION - FREQUENCY: FREQUENCY: INTERMITTENT

## 2019-12-04 ASSESSMENT — PAIN DESCRIPTION - LOCATION: LOCATION: ABDOMEN

## 2019-12-04 ASSESSMENT — PAIN DESCRIPTION - PAIN TYPE: TYPE: ACUTE PAIN

## 2019-12-04 ASSESSMENT — PAIN SCALES - GENERAL: PAINLEVEL_OUTOF10: 0

## 2019-12-06 LAB
CULTURE: ABNORMAL
CULTURE: ABNORMAL
DIRECT EXAM: ABNORMAL
Lab: ABNORMAL
SPECIMEN DESCRIPTION: ABNORMAL

## 2019-12-11 ENCOUNTER — TELEPHONE (OUTPATIENT)
Dept: WOUND CARE | Age: 49
End: 2019-12-11

## 2019-12-16 ENCOUNTER — TELEPHONE (OUTPATIENT)
Dept: WOUND CARE | Age: 49
End: 2019-12-16

## 2019-12-16 ENCOUNTER — HOSPITAL ENCOUNTER (OUTPATIENT)
Dept: WOUND CARE | Age: 49
Discharge: HOME | End: 2019-12-16
Payer: COMMERCIAL

## 2019-12-16 VITALS
DIASTOLIC BLOOD PRESSURE: 75 MMHG | SYSTOLIC BLOOD PRESSURE: 110 MMHG | RESPIRATION RATE: 16 BRPM | TEMPERATURE: 96.7 F | HEART RATE: 99 BPM

## 2019-12-16 DIAGNOSIS — T81.30XD ABDOMINAL WOUND DEHISCENCE, SUBSEQUENT ENCOUNTER: Primary | ICD-10-CM

## 2019-12-16 PROCEDURE — 11043 DBRDMT MUSC&/FSCA 1ST 20/<: CPT

## 2019-12-16 PROCEDURE — 11043 DBRDMT MUSC&/FSCA 1ST 20/<: CPT | Performed by: INTERNAL MEDICINE

## 2019-12-16 RX ORDER — ONDANSETRON 8 MG/1
8 TABLET, ORALLY DISINTEGRATING ORAL
COMMUNITY

## 2019-12-16 RX ORDER — DOXYCYCLINE HYCLATE 100 MG/1
100 CAPSULE ORAL 2 TIMES DAILY
Qty: 42 CAPSULE | Refills: 0 | Status: SHIPPED | OUTPATIENT
Start: 2019-12-16 | End: 2020-01-06

## 2019-12-16 RX ORDER — NITROFURANTOIN 25; 75 MG/1; MG/1
100 CAPSULE ORAL 2 TIMES DAILY
COMMUNITY
Start: 2019-12-10 | End: 2019-12-17

## 2020-01-06 ENCOUNTER — HOSPITAL ENCOUNTER (OUTPATIENT)
Dept: WOUND CARE | Age: 50
Discharge: HOME | End: 2020-01-06
Payer: COMMERCIAL

## 2020-01-06 VITALS
TEMPERATURE: 96.9 F | DIASTOLIC BLOOD PRESSURE: 81 MMHG | HEART RATE: 103 BPM | RESPIRATION RATE: 16 BRPM | SYSTOLIC BLOOD PRESSURE: 117 MMHG

## 2020-01-06 PROCEDURE — 11042 DBRDMT SUBQ TIS 1ST 20SQCM/<: CPT

## 2020-01-06 PROCEDURE — 11042 DBRDMT SUBQ TIS 1ST 20SQCM/<: CPT | Performed by: INTERNAL MEDICINE

## 2020-01-06 RX ORDER — LIDOCAINE HYDROCHLORIDE 40 MG/ML
SOLUTION TOPICAL ONCE
Status: DISCONTINUED | OUTPATIENT
Start: 2020-01-06 | End: 2020-01-07 | Stop reason: HOSPADM

## 2020-01-06 NOTE — PLAN OF CARE
Problem: Wound:  Goal: Will show signs of wound healing; wound closure and no evidence of infection  Description  Will show signs of wound healing; wound closure and no evidence of infection  Outcome: Ongoing     Problem: Falls - Risk of:  Goal: Will remain free from falls  Description  Will remain free from falls  Outcome: Ongoing

## 2020-01-06 NOTE — PROGRESS NOTES
[address]   Progress Note and Procedure Note      [address] [facility] RECORD NUMBER:  [id number]  AGE: 47 y.o. GENDER: female  : 1970  EPISODE DATE:  2020    Subjective:     Chief Complaint   Patient presents with    Wound Check         HISTORY of PRESENT ILLNESS HPI     Jes Dao is a 47 y.o. female who presents today for wound/ulcer evaluation. History of Wound Context: Midline upper abdomen nonhealing wounds. Wound/Ulcer Pain Timing/Severity: none    Ulcer Identification:  Ulcer Type: non-healing surgical    Contributing Factors: malnutrition  She denied any purulent drainage, denied fever or chills, no nausea vomiting or diarrhea. History of colon cancer status post colectomy/ileostomy.   Wound culture on 2019 grew MRSA      PAST MEDICAL HISTORY        Diagnosis Date    Cancer [facility])     colon    Hx of ileostomy     reversal done  and 2016       PAST SURGICAL HISTORY    Past Surgical History:   Procedure Laterality Date    ABDOMINAL EXPLORATION SURGERY      CHOLECYSTECTOMY      COLONOSCOPY N/A 2018    COLONOSCOPY WITH BIOPSY performed by Jordy Hammer DO at [facility] Left     ILEOSTOMY OR JEJUNOSTOMY Right     [place] INSJ [address] [facility] CTR VAD W/SUBQ PORT AGE 5 YR/> Right 2018    PORT INSERTION  INTERNAL JUGULAR /CHEST WALL performed by Jordy Hammer DO at [address] [facility] HISTORY    Family History   Problem Relation Age of Onset    Diabetes Father     Heart Disease Maternal Grandmother     Heart Disease Maternal Grandfather        SOCIAL HISTORY    Social History     Tobacco Use    Smoking status: Former Smoker     Last attempt to quit: 2012     Years since quittin.1    Smokeless tobacco: Never Used   Substance Use Topics    Alcohol use: No    Drug use: No       ALLERGIES    Allergies   Allergen Reactions    Augmentin [Amoxicillin-Pot Clavulanate] Hives Odor None 12/4/2019  9:13 AM   Margins Defined edges 12/4/2019  9:13 AM   Mala-wound Assessment Intact 12/4/2019  9:13 AM   Red%Wound Bed 100 12/4/2019  9:13 AM   Yellow%Wound Bed 20 11/20/2019  9:09 AM   Number of days: 47       Wound 12/04/19 Abdomen Medial;Upper;Proximal Wound # 4 (Active)   Wound Non-Healing Surgical 1/6/2020  9:25 AM   Dressing Status Old drainage;New drainage 1/6/2020  9:25 AM   Wound Cleansed Rinsed/Irrigated with saline 1/6/2020  9:25 AM   Wound Length (cm) 0.5 cm 1/6/2020  9:25 AM   Wound Width (cm) 0.5 cm 1/6/2020  9:25 AM   Wound Depth (cm) 0.9 cm 1/6/2020  9:25 AM   Wound Surface Area (cm^2) 0.25 cm^2 1/6/2020  9:25 AM   Change in Wound Size % (l*w) 16.67 1/6/2020  9:25 AM   Wound Volume (cm^3) 0.22 cm^3 1/6/2020  9:25 AM   Wound Healing % 79 1/6/2020  9:25 AM   Post-Procedure Length (cm) 0.5 cm 1/6/2020  9:25 AM   Post-Procedure Width (cm) 0.5 cm 1/6/2020  9:25 AM   Post-Procedure Depth (cm) 0.9 cm 1/6/2020  9:25 AM   Post-Procedure Surface Area (cm^2) 0.25 cm^2 1/6/2020  9:25 AM   Post-Procedure Volume (cm^3) 0.22 cm^3 1/6/2020  9:25 AM   Wound Assessment Drainage;Red 1/6/2020  9:25 AM   Drainage Amount Moderate 1/6/2020  9:25 AM   Drainage Description Purulent 1/6/2020  9:25 AM   Odor None 1/6/2020  9:25 AM   Mala-wound Assessment Intact; Clean 1/6/2020  9:25 AM   Red%Wound Bed 100 1/6/2020  9:25 AM   Number of days: 32          Percent of Wound(s)/Ulcer(s) Debrided: 100%    Total Surface Area Debrided:  3.4 sq cm       Estimated Blood Loss:  Minimal    Hemostasis Achieved:  by pressure    Procedural Pain:  0  / 10     Post Procedural Pain:  0 / 10     Response to treatment:  Well tolerated by patient. Plan:   P.o. doxycycline for 1 week  Treatment Note please see attached Discharge Instructions    Written patient dismissal instructions given to patient and signed by patient or POA.          Discharge Instructions         [facility] HY[facility] [address] [facility] Instructions / Physician Orders  DATE: 01/06/2020     Home Care: [facility]     SUPPLIES ORDERED THRU: [facility]     Wound Location:  Mid and upper abdomen     Cleanse with: Liquid antibacterial soap and water, rinse well      Dressing Orders:  Silvercel rope to wounds, cover with ABD, secure with mefix tape. (May pack upper abdominal wound with 1/4\" iodoform if unable to pack with silvercel)     Frequency:  Daily     Additional Orders: Increase protein to diet (meat, cheese, eggs, fish, peanut butter, nuts and beans)  Multivitamin daily  Wear maxi pad in underwear to catch drainage  Start drinking boost or ensure  Start taking Doxycycline      HYPERBARIC TREATMENT-                [id number] #     Your next appointment with [facility] is in 2 weeks     ROOM TYPE   []? ?? CHAIR     [x]? ?? BED   []? ?? EITHER        TIME []??? 45 MIN     []? ?? 60 MIN     (Please note your next appointment above and if you are unable to keep, kindly give a 24 hour notice. Thank you.)     If you experience any of the following, please call [facility] during business hours:  [phone]     * Increase in Pain  * Temperature over 101  * Increase in drainage from your wound  * Drainage with a foul odor  * Bleeding  * Increase in swelling  * Need for compression bandage changes due to slippage, breakthrough drainage.     If you need medical attention outside of the business hours of the [facility] please contact your PCP or go to the nearest emergency room.     The information contained in the After Visit Summary has been reviewed with me, the patient and/or responsible adult, by my health care provider(s). I had the opportunity to ask questions regarding this information. I have elected to receive;      [x]? ? ? After Visit Summary  [x]? ?? Comprehensive Discharge Instruction        Patient signature______________________________________Date:________  Electronically

## 2020-01-20 ENCOUNTER — HOSPITAL ENCOUNTER (OUTPATIENT)
Dept: WOUND CARE | Age: 50
Discharge: HOME | End: 2020-01-20
Payer: COMMERCIAL

## 2020-01-20 VITALS
DIASTOLIC BLOOD PRESSURE: 72 MMHG | SYSTOLIC BLOOD PRESSURE: 118 MMHG | TEMPERATURE: 97.3 F | HEART RATE: 90 BPM | RESPIRATION RATE: 18 BRPM

## 2020-01-20 PROCEDURE — 11042 DBRDMT SUBQ TIS 1ST 20SQCM/<: CPT | Performed by: INTERNAL MEDICINE

## 2020-01-20 PROCEDURE — 6370000000 HC RX 637 (ALT 250 FOR IP): Performed by: INTERNAL MEDICINE

## 2020-01-20 PROCEDURE — 11042 DBRDMT SUBQ TIS 1ST 20SQCM/<: CPT

## 2020-01-20 RX ORDER — LIDOCAINE HYDROCHLORIDE 40 MG/ML
SOLUTION TOPICAL ONCE
Status: COMPLETED | OUTPATIENT
Start: 2020-01-20 | End: 2020-01-20

## 2020-01-20 RX ORDER — DOXYCYCLINE HYCLATE 100 MG/1
100 CAPSULE ORAL 2 TIMES DAILY
Qty: 28 CAPSULE | Refills: 0 | Status: SHIPPED | OUTPATIENT
Start: 2020-01-20 | End: 2020-02-03

## 2020-01-20 RX ADMIN — LIDOCAINE HYDROCHLORIDE 10 ML: 40 SOLUTION TOPICAL at 09:35

## 2020-01-20 NOTE — PROGRESS NOTES
[address]   Progress Note and Procedure Note      [address] [facility] Karla RECORD NUMBER:  [id number]  AGE: 47 y.o. GENDER: female  : 1970  EPISODE DATE:  2020    Subjective:     Chief Complaint   Patient presents with    Wound Check         HISTORY of PRESENT ILLNESS HPI     Primus Ta is a 47 y.o. female who presents today for wound/ulcer evaluation. History of Wound Context: Nonhealing abdominal wounds. Wound/Ulcer Pain Timing/Severity: intermittent  Quality of pain: aching  Severity:  5 / 10     Associated Signs/Symptoms: drainage    Ulcer Identification:  Ulcer Type: non-healing surgical    Contributing Factors: malnutrition   History of colon cancer status post colectomy/ileostomy. Wound culture on 2019 grew MRSA    The patient had a CT scan of the abdomen pelvis at [facility] on 2020 with a impression of: Worsening retroperitoneal and mesenteric lymphadenopathy, likely   metastatic. Findings suggestive of portal hypertension, including splenomegaly, trace   ascites and vascular collaterals. Decreased size of the presacral fluid collection.  Stable midline   abdominal wall collection probably communicating with the midline wound containing packing material.  PAST MEDICAL HISTORY        Diagnosis Date    Cancer [facility])     colon    Hx of ileostomy     reversal done  and 2016       PAST SURGICAL HISTORY    Past Surgical History:   Procedure Laterality Date    ABDOMINAL EXPLORATION SURGERY      CHOLECYSTECTOMY      COLONOSCOPY N/A 2018    COLONOSCOPY WITH BIOPSY performed by Tanya Jackson DO at [facility] Left     ILEOSTOMY OR JEJUNOSTOMY Right     AL INSJ [facility] CTR VAD W/SUBQ PORT AGE 5 YR/> Right 2018    PORT INSERTION  INTERNAL JUGULAR /CHEST WALL performed by Tanya Jackson DO at [address] HISTORY    Family History   Problem Relation Age of Onset    Diabetes 160 lb (72.6 kg)   11/20/19 189 lb (85.7 kg)   07/01/19 211 lb (95.7 kg)       PHYSICAL EXAM    General Appearance: alert and oriented to person, place and time, well-developed and well-nourished, in no acute distress      Assessment:        Problem List Items Addressed This Visit     Abdominal wound dehiscence - Primary           Procedure Note  Indications:  Based on my examination of this patient's wound(s)/ulcer(s) today, debridement is required to promote healing and evaluate the wound base. Performed by: Caity Watts MD    Consent obtained:  Yes    Time out taken:  Yes    Pain Control: Anesthetic  Anesthetic: 4% Lidocaine Liquid Topical       Debridement: Excisional Debridement    Using curette the wound(s)/ulcer(s) was/were sharply debrided down through and including the removal of subcutaneous tissue.         Devitalized Tissue Debrided:  biofilm and exudate    Pre Debridement Measurements:  Are located in the [place]  Documentation Flow Sheet    Wound/Ulcer #: 1,4    Post Debridement Measurements:  Wound/Ulcer Descriptions are Pre Debridement except measurements:    Incision 07/06/15 Chest Left;Upper (Active)   Number of days: 6139       Incision 01/05/18 Chest Right (Active)   Number of days: 467       Wound 11/20/19 Abdomen Mid;Lower #1 (Active)   Wound Image   1/20/2020  9:10 AM   Wound Non-Healing Surgical 1/20/2020  9:10 AM   Dressing Status Old drainage;New drainage 1/20/2020  9:10 AM   Dressing Changed Changed/New 1/6/2020  9:58 AM   Dressing/Treatment Other (comment) 1/6/2020  9:58 AM   Wound Cleansed Rinsed/Irrigated with saline 1/20/2020  9:10 AM   Wound Length (cm) 1.3 cm 1/20/2020  9:10 AM   Wound Width (cm) 1 cm 1/20/2020  9:10 AM   Wound Depth (cm) 2.8 cm 1/20/2020  9:10 AM   Wound Surface Area (cm^2) 1.3 cm^2 1/20/2020  9:10 AM   Change in Wound Size % (l*w) 87 1/20/2020  9:10 AM   Wound Volume (cm^3) 3.64 cm^3 1/20/2020  9:10 AM   Wound Healing % 96 1/20/2020  9:10 AM   Post-Procedure 1/20/2020  9:10 AM   Number of days: 46          Percent of Wound(s)/Ulcer(s) Debrided: 100%    Total Surface Area Debrided:  1.3 sq cm       Estimated Blood Loss:  Minimal    Hemostasis Achieved:  not needed    Procedural Pain:  0  / 10     Post Procedural Pain:  0 / 10     Response to treatment:  Well tolerated by patient. Plan:   P.o. doxycycline for 2 weeks  Follow-up with Dr. Ezra Ventura to evaluate for possible abscess drainage  Treatment Note please see attached Discharge Instructions    Written patient dismissal instructions given to patient and signed by patient or POA. Discharge Instructions              [address] [place] HYPERBAR[facility] TREATMENT  CENTER                                 Visit Carol Instructions / Physician Orders  DATE:  01/20/2020     Home Care: [facility]     SUPPLIES ORDERED THRU: [place]     Wound Location:  Mid and upper abdomen     Cleanse with: Liquid antibacterial soap and water, rinse well      Dressing Orders:  Silvercel rope to wounds, cover with ABD, secure with mefix tape. (May pack upper abdominal wound with 1/4\" iodoform if unable to pack with silvercel)     Frequency:  Daily     Additional Orders: Increase protein to diet (meat, cheese, eggs, fish, peanut butter, nuts and beans)  Multivitamin daily  Wear maxi pad in underwear to catch drainage  Start drinking boost or ensure  Continue taking Doxycycline   Make appointment with surgeon due to CT results- fluid pocket?     HYPERBARIC TREATMENT-                TREATMENT #     [facility] Wednesday with Dr Nahomy Lee   []???? CHAIR     [x]? ??? BED   []???? EITHER        TIME []???? 45 MIN     []???? 60 MIN     (Please note your next appointment above and if you are unable to keep, kindly give a 24 hour notice.  Thank you.)     If you experience any of the following, please [facility] during business hours:  [phone]     * Increase in Pain  * Temperature over

## 2020-01-22 ENCOUNTER — HOSPITAL ENCOUNTER (OUTPATIENT)
Dept: WOUND CARE | Age: 50
Discharge: HOME | End: 2020-01-22
Payer: COMMERCIAL

## 2020-01-22 VITALS
SYSTOLIC BLOOD PRESSURE: 115 MMHG | HEART RATE: 103 BPM | TEMPERATURE: 96.5 F | DIASTOLIC BLOOD PRESSURE: 79 MMHG | RESPIRATION RATE: 16 BRPM

## 2020-01-22 PROBLEM — E44.1 MILD PROTEIN-CALORIE MALNUTRITION (HCC): Status: ACTIVE | Noted: 2019-10-30

## 2020-01-22 PROBLEM — I42.2 HYPERTROPHIC NONOBSTRUCTIVE CARDIOMYOPATHY (HCC): Chronic | Status: ACTIVE | Noted: 2019-12-09

## 2020-01-22 PROBLEM — E44.1 MILD PROTEIN-CALORIE MALNUTRITION (HCC): Chronic | Status: ACTIVE | Noted: 2019-10-30

## 2020-01-22 PROBLEM — C18.9 COLON CANCER METASTASIZED TO LUNG (HCC): Chronic | Status: ACTIVE | Noted: 2018-01-10

## 2020-01-22 PROBLEM — E66.9 OBESITY, CLASS II, BMI 35-39.9: Status: ACTIVE | Noted: 2019-10-25

## 2020-01-22 PROBLEM — E66.9 OBESITY, CLASS II, BMI 35-39.9: Chronic | Status: ACTIVE | Noted: 2019-10-25

## 2020-01-22 PROBLEM — C18.5 MALIGNANT NEOPLASM OF SPLENIC FLEXURE (HCC): Status: ACTIVE | Noted: 2017-12-05

## 2020-01-22 PROBLEM — C34.2 MALIGNANT NEOPLASM OF MIDDLE LOBE OF RIGHT LUNG (HCC): Chronic | Status: ACTIVE | Noted: 2017-08-30

## 2020-01-22 PROBLEM — G62.9 NEUROPATHY: Status: ACTIVE | Noted: 2020-01-22

## 2020-01-22 PROBLEM — G62.9 NEUROPATHY: Chronic | Status: ACTIVE | Noted: 2020-01-22

## 2020-01-22 PROBLEM — C78.00 COLON CANCER METASTASIZED TO LUNG (HCC): Chronic | Status: ACTIVE | Noted: 2018-01-10

## 2020-01-22 PROBLEM — C18.5 MALIGNANT NEOPLASM OF SPLENIC FLEXURE (HCC): Chronic | Status: ACTIVE | Noted: 2017-12-05

## 2020-01-22 PROBLEM — T81.30XA ABDOMINAL WOUND DEHISCENCE: Chronic | Status: ACTIVE | Noted: 2019-11-20

## 2020-01-22 PROBLEM — C34.2 MALIGNANT NEOPLASM OF MIDDLE LOBE OF RIGHT LUNG (HCC): Status: ACTIVE | Noted: 2017-08-30

## 2020-01-22 PROBLEM — E46 PROTEIN-CALORIE MALNUTRITION (HCC): Chronic | Status: ACTIVE | Noted: 2019-11-20

## 2020-01-22 PROBLEM — I42.2 HYPERTROPHIC NONOBSTRUCTIVE CARDIOMYOPATHY (HCC): Status: ACTIVE | Noted: 2019-12-09

## 2020-01-22 PROCEDURE — 87205 SMEAR GRAM STAIN: CPT

## 2020-01-22 PROCEDURE — 87176 TISSUE HOMOGENIZATION CULTR: CPT

## 2020-01-22 PROCEDURE — 87186 SC STD MICRODIL/AGAR DIL: CPT

## 2020-01-22 PROCEDURE — 86403 PARTICLE AGGLUT ANTBDY SCRN: CPT

## 2020-01-22 PROCEDURE — 87075 CULTR BACTERIA EXCEPT BLOOD: CPT

## 2020-01-22 PROCEDURE — 87070 CULTURE OTHR SPECIMN AEROBIC: CPT

## 2020-01-22 PROCEDURE — 11043 DBRDMT MUSC&/FSCA 1ST 20/<: CPT

## 2020-01-22 ASSESSMENT — PAIN SCALES - GENERAL: PAINLEVEL_OUTOF10: 3

## 2020-01-22 NOTE — DISCHARGE INSTR - COC
Isolation/Infection:   Isolation          No Isolation        Patient Infection Status     Infection Onset Added Last Indicated Last Indicated By Review Planned Expiration Resolved Resolved By    [facility] 12/04/19 12/06/19 12/04/19 Anaerobic and Aerobic Culture              Nurse Assessment:  Last Vital Signs: /79   Pulse 103   Temp 96.5 °F (35.8 °C)   Resp 16     Last documented pain score (0-10 scale): Pain Level:  3(wound)  Last Weight:   Wt Readings from Last 1 Encounters:   12/04/19 160 lb (72.6 kg)     Mental Status:  {IP PT MENTAL STATUS:20030}    IV Access:  [address] [facility] SILVIO IV ACCESS:838263379}    Nursing Mobility/ADLs:  Walking   {P DME CPVE:059636268}  Transfer  {[facility] DME HKEL:355451080}  Bathing  {P DME REWR:480209753}  Dressing  {P DME IYEJ:933295170}  Toileting  {[facility] DME ZKOS:452207249}  Feeding  {[facility] DME NZIS:688862881}  Med Admin  {[facility] DME OCZT:605791439}  Med Delivery   { SILVIO MED Delivery:993756259}    Wound Care Documentation and Therapy:  Incision 07/06/15 Chest Left;Upper (Active)   Number of days: 1660       Incision 01/05/18 Chest Right (Active)   Number of days: 803       Wound 11/20/19 Abdomen Mid;Lower #1 (Active)   Wound Image   1/20/2020  9:10 AM   Wound Non-Healing Surgical 1/22/2020  9:02 AM   Dressing Status Old drainage;New drainage 1/22/2020  9:02 AM   Dressing Changed Changed/New 1/20/2020  9:46 AM   Dressing/Treatment Other (comment) 1/20/2020  9:46 AM   Wound Cleansed Rinsed/Irrigated with saline 1/22/2020  9:02 AM   Wound Length (cm) 1.2 cm 1/22/2020  9:02 AM   Wound Width (cm) 1.2 cm 1/22/2020  9:02 AM   Wound Depth (cm) 2.1 cm 1/22/2020  9:02 AM   Wound Surface Area (cm^2) 1.44 cm^2 1/22/2020  9:02 AM   Change in Wound Size % (l*w) 85.6 1/22/2020  9:02 AM   Wound Volume (cm^3) 3.02 cm^3 1/22/2020  9:02 AM   Wound Healing % 97 1/22/2020  9:02 AM   Post-Procedure Length (cm) 2.5 cm 1/22/2020  9:02 AM   Post-Procedure Width (cm) 1.1 cm 1/22/2020  9:02 AM   Post-Procedure Depth (cm) 0.6 cm 1/22/2020  9:02 AM   Post-Procedure Surface Area (cm^2) 2.75 cm^2 1/22/2020  9:02 AM   Post-Procedure Volume (cm^3) 1.65 cm^3 1/22/2020  9:02 AM   Wound Assessment Drainage 1/22/2020  9:02 AM   Drainage Amount Moderate 1/22/2020  9:02 AM   Drainage Description Serosanguinous 1/22/2020  9:02 AM   Odor None 1/22/2020  9:02 AM   Margins Defined edges 1/22/2020  9:02 AM   Mala-wound Assessment Intact; Clean 1/22/2020  9:02 AM   [place]%Wound Bed 20 1/20/2020  9:10 AM   Red%Wound Bed 100 1/22/2020  9:02 AM   Yellow%Wound Bed 40 1/20/2020  9:10 AM   Number of days: 63       Wound 12/04/19 Abdomen Medial;Upper;Proximal Wound # 4 (Active)   Wound Image   1/20/2020  9:10 AM   Wound Non-Healing Surgical 1/22/2020  9:02 AM   Dressing Status Old drainage;New drainage 1/22/2020  9:02 AM   Dressing Changed Changed/New 1/20/2020  9:46 AM   Dressing/Treatment Other (comment) 1/20/2020  9:46 AM   Wound Cleansed Rinsed/Irrigated with saline 1/22/2020  9:02 AM   Wound Length (cm) 0.4 cm 1/22/2020  9:02 AM   Wound Width (cm) 0.5 cm 1/22/2020  9:02 AM   Wound Depth (cm) 1.6 cm 1/22/2020  9:02 AM   Wound Surface Area (cm^2) 0.2 cm^2 1/22/2020  9:02 AM   Change in Wound Size % (l*w) 33.33 1/22/2020  9:02 AM   Wound Volume (cm^3) 0.32 cm^3 1/22/2020  9:02 AM   Wound Healing % 70 1/22/2020  9:02 AM   Post-Procedure Length (cm) 0.6 cm 1/22/2020  9:02 AM   Post-Procedure Width (cm) 0.5 cm 1/22/2020  9:02 AM   Post-Procedure Depth (cm) 1.6 cm 1/22/2020  9:02 AM   Post-Procedure Surface Area (cm^2) 0.3 cm^2 1/22/2020  9:02 AM   Post-Procedure Volume (cm^3) 0.48 cm^3 1/22/2020  9:02 AM   Wound Assessment Drainage 1/22/2020  9:02 AM   Drainage Amount Large 1/22/2020  9:02 AM   Drainage Description Purulent 1/22/2020  9:02 AM   Odor None 1/22/2020  9:02 AM   Margins Attached edges 1/22/2020  9:02 AM   Mala-wound Assessment Clean; Intact 1/22/2020  9:02 AM   Red%Wound Bed 100 1/22/2020  9:02 AM   Number of days: 48 {Prognosis:9326887534}    Recommended Labs or Other Treatments After Discharge: ***    Physician Certification: I certify the above information and transfer of Adriana Salmon  is necessary for the continuing treatment of the diagnosis listed and that she requires {Admit to Appropriate Level of Care:54821} for {GREATER/LESS:401635928} 30 days.      Update Admission H&P: {CHP DME Changes in WKMVZ:785359689}    PHYSICIAN SIGNATURE:  {Esignature:978869694}

## 2020-01-24 LAB
CULTURE: ABNORMAL
CULTURE: ABNORMAL
DIRECT EXAM: ABNORMAL
DIRECT EXAM: ABNORMAL
Lab: ABNORMAL
SPECIMEN DESCRIPTION: ABNORMAL

## 2020-01-27 ENCOUNTER — HOSPITAL ENCOUNTER (OUTPATIENT)
Dept: WOUND CARE | Age: 50
Discharge: HOME | End: 2020-01-27
Payer: COMMERCIAL

## 2020-01-27 VITALS
HEIGHT: 63 IN | WEIGHT: 153 LBS | TEMPERATURE: 97.8 F | BODY MASS INDEX: 27.11 KG/M2 | RESPIRATION RATE: 16 BRPM | HEART RATE: 106 BPM

## 2020-01-27 PROCEDURE — 11042 DBRDMT SUBQ TIS 1ST 20SQCM/<: CPT

## 2020-01-27 PROCEDURE — 11042 DBRDMT SUBQ TIS 1ST 20SQCM/<: CPT | Performed by: INTERNAL MEDICINE

## 2020-01-27 RX ORDER — LIDOCAINE HYDROCHLORIDE 40 MG/ML
SOLUTION TOPICAL ONCE
Status: DISCONTINUED | OUTPATIENT
Start: 2020-01-27 | End: 2020-01-28 | Stop reason: HOSPADM

## 2020-01-27 ASSESSMENT — PAIN DESCRIPTION - PROGRESSION: CLINICAL_PROGRESSION: NOT CHANGED

## 2020-01-27 ASSESSMENT — PAIN DESCRIPTION - ONSET: ONSET: ON-GOING

## 2020-01-27 ASSESSMENT — PAIN DESCRIPTION - LOCATION: LOCATION: ABDOMEN

## 2020-01-27 ASSESSMENT — PAIN DESCRIPTION - DESCRIPTORS: DESCRIPTORS: ACHING

## 2020-01-27 ASSESSMENT — PAIN DESCRIPTION - PAIN TYPE: TYPE: CHRONIC PAIN

## 2020-01-27 ASSESSMENT — PAIN DESCRIPTION - FREQUENCY: FREQUENCY: INTERMITTENT

## 2020-01-27 ASSESSMENT — PAIN SCALES - GENERAL: PAINLEVEL_OUTOF10: 4

## 2020-01-27 NOTE — PROGRESS NOTES
No current facility-administered medications on file prior to encounter. REVIEW OF SYSTEMS    Pertinent items are noted in HPI. Objective:      Pulse 106   Temp 97.8 °F (36.6 °C) (Tympanic)   Resp 16   Ht 5' 3\" (1.6 m)   Wt 153 lb (69.4 kg)   BMI 27.10 kg/m²     Wt Readings from Last 3 Encounters:   01/27/20 153 lb (69.4 kg)   12/04/19 160 lb (72.6 kg)   11/20/19 189 lb (85.7 kg)       PHYSICAL EXAM    General Appearance: alert and oriented to person, place and time, well-developed and well-nourished, in no acute distress      Assessment:        Problem List Items Addressed This Visit     Abdominal wound dehiscence - Primary (Chronic)           Procedure Note  Indications:  Based on my examination of this patient's wound(s)/ulcer(s) today, debridement is required to promote healing and evaluate the wound base. Performed by: Junito Padron MD    Consent obtained:  Yes    Time out taken:  Yes    Pain Control: Anesthetic  Anesthetic: 4% Lidocaine Liquid Topical       Debridement: Excisional Debridement    Using curette the wound(s)/ulcer(s) was/were debrided down through and including the removal of subcutaneous tissue.         Devitalized Tissue Debrided:  fibrin, biofilm and slough    Pre Debridement Measurements:  Are located in the [place]  Documentation Flow Sheet    Wound/Ulcer #: 1-4    Post Debridement Measurements:  Wound/Ulcer Descriptions are Pre Debridement except measurements:    Incision 07/06/15 Chest Left;Upper (Active)   Number of days: 5896       Incision 01/05/18 Chest Right (Active)   Number of days: 307       Wound 11/20/19 Abdomen Mid;Lower #1 (Active)   Wound Image   1/20/2020  9:10 AM   Wound Non-Healing Surgical 1/27/2020 10:51 AM   Dressing Status Old drainage;New drainage 1/27/2020 10:51 AM   Dressing Changed Changed/New 1/27/2020 10:51 AM   Dressing/Treatment Other (comment) 1/22/2020  9:42 AM   Wound Cleansed Rinsed/Irrigated with saline 1/27/2020 10:51 AM   Wound (cm^2) 0.6 cm^2 1/27/2020 10:51 AM   Post-Procedure Volume (cm^3) 0.6 cm^3 1/27/2020 10:51 AM   Distance Tunneling (cm) 4.4 cm 1/27/2020 10:51 AM   Tunneling Position ___ O'Clock 11:00 1/27/2020 10:51 AM   Wound Assessment Drainage 1/27/2020 10:51 AM   Drainage Amount Large 1/27/2020 10:51 AM   Drainage Description Serosanguinous 1/27/2020 10:51 AM   Odor None 1/27/2020 10:51 AM   Margins Defined edges 1/27/2020 10:51 AM   Mala-wound Assessment Clean; Intact 1/27/2020 10:51 AM   Red%Wound Bed 100 1/27/2020 10:51 AM   Number of days: 54          Percent of Wound(s)/Ulcer(s) Debrided: 100%    Total Surface Area Debrided:  2.2 sq cm         Estimated Blood Loss:  Minimal    Hemostasis Achieved:  not needed    Procedural Pain:  3  / 10     Post Procedural Pain:  3 / 10     Response to treatment:  Well tolerated by patient. Plan:   Continue oral doxycycline for 1 more week. Treatment Note please see attached Discharge Instructions    Written patient dismissal instructions given to patient and signed by patient or POA. Discharge Instructions         [address] [place] and HYPERBARIC TREATMENT  CENTER                                 Visit Carol Instructions / Physician Orders  DATE:  01/27/2020     Home Care: n/a     SUPPLIES ORDERED THRU: [facility]     Wound Location:  Mid and upper abdomen     Cleanse with: Liquid antibacterial soap and water, rinse well      Dressing Orders:  Silvercel rope to wounds, cover with ABD, secure with mefix tape.  (May pack upper abdominal wound with 1/4\" iodoform if unable to pack with silvercel)     Frequency:  Daily     Additional Orders: Increase protein to diet (meat, cheese, eggs, fish, peanut butter, nuts and beans)  Multivitamin daily  Start drinking boost or ensure  Continue taking Doxycycline- take zofran if needed     HYPERBARIC TREATMENT-                TREATMENT #     Your next appointment with [facility] is in 1 week with Dr Bryan Lawrence

## 2020-02-03 ENCOUNTER — HOSPITAL ENCOUNTER (OUTPATIENT)
Dept: WOUND CARE | Age: 50
Discharge: HOME | End: 2020-02-03
Payer: COMMERCIAL

## 2020-02-03 VITALS
SYSTOLIC BLOOD PRESSURE: 115 MMHG | RESPIRATION RATE: 16 BRPM | DIASTOLIC BLOOD PRESSURE: 77 MMHG | HEIGHT: 63 IN | BODY MASS INDEX: 27.11 KG/M2 | HEART RATE: 102 BPM | WEIGHT: 153 LBS | TEMPERATURE: 98.3 F

## 2020-02-03 PROCEDURE — 11042 DBRDMT SUBQ TIS 1ST 20SQCM/<: CPT

## 2020-02-03 PROCEDURE — 6370000000 HC RX 637 (ALT 250 FOR IP): Performed by: INTERNAL MEDICINE

## 2020-02-03 PROCEDURE — 11042 DBRDMT SUBQ TIS 1ST 20SQCM/<: CPT | Performed by: INTERNAL MEDICINE

## 2020-02-03 RX ORDER — LIDOCAINE HYDROCHLORIDE 40 MG/ML
SOLUTION TOPICAL ONCE
Status: COMPLETED | OUTPATIENT
Start: 2020-02-03 | End: 2020-02-03

## 2020-02-03 RX ADMIN — LIDOCAINE HYDROCHLORIDE 10 ML: 40 SOLUTION TOPICAL at 10:55

## 2020-02-03 ASSESSMENT — PAIN DESCRIPTION - PAIN TYPE: TYPE: CHRONIC PAIN

## 2020-02-03 ASSESSMENT — PAIN SCALES - GENERAL: PAINLEVEL_OUTOF10: 0

## 2020-02-03 NOTE — PROGRESS NOTES
[address]   Progress Note and Procedure Note      [address] [facility] RECORD NUMBER:  [id number]  AGE: 47 y.o. GENDER: female  : 1970  EPISODE DATE:  2/3/2020    Subjective:     Chief Complaint   Patient presents with    Wound Check         HISTORY of PRESENT ILLNESS HPI     Anna Bolden is a 47 y.o. female who presents today for wound/ulcer evaluation. History of Wound Context: Abdominal wall wounds  Wound/Ulcer Pain Timing/Severity: none    Ulcer Identification:  Ulcer Type: non-healing surgical    Contributing Factors: malnutrition  She received around 12 days of doxycycline p.o. but for the last 2 days she has been throwing up. She denied any fever or chills  She is complaining of back pain and left cervical lump. She had wound exploration and removal of suture done by Dr. Ana Laura Allen on 2020 with MRSA growth on culture. History of colon cancer status post colectomy/ileostomy. Wound culture on 2019 grew MRSA    The patient had a CT scan of the abdomen pelvis at [facility] clinic on 2020 with a impression of: Worsening retroperitoneal and mesenteric lymphadenopathy, likely   metastatic. Findings suggestive of portal hypertension, including splenomegaly, trace   ascites and vascular collaterals. Decreased size of the presacral fluid collection.  Stable midline   abdominal wall collection probably communicating with the midline wound containing packing material.        PAST MEDICAL HISTORY        Diagnosis Date    Cancer [facility])     colon    Hx of ileostomy     reversal done  and 2016       PAST SURGICAL HISTORY    Past Surgical History:   Procedure Laterality Date    ABDOMINAL EXPLORATION SURGERY      CHOLECYSTECTOMY      COLONOSCOPY N/A 2018    COLONOSCOPY WITH BIOPSY performed by Nick [facility]ustillos DO at [facility] Left     ILEOSTOMY OR JEJUNOSTOMY Right     MD INSJ [facility] CTR VAD W/SUBQ PORT AGE 4 YR/> Right 2018 Cleansed Rinsed/Irrigated with saline 2/3/2020 10:21 AM   Wound Length (cm) 0.7 cm 2/3/2020 10:21 AM   Wound Width (cm) 1 cm 2/3/2020 10:21 AM   Wound Depth (cm) 1 cm 2/3/2020 10:21 AM   Wound Surface Area (cm^2) 0.7 cm^2 2/3/2020 10:21 AM   Change in Wound Size % (l*w) 93 2/3/2020 10:21 AM   Wound Volume (cm^3) 0.7 cm^3 2/3/2020 10:21 AM   Wound Healing % 99 2/3/2020 10:21 AM   Post-Procedure Length (cm) 0.7 cm 2/3/2020 10:21 AM   Post-Procedure Width (cm) 1 cm 2/3/2020 10:21 AM   Post-Procedure Depth (cm) 1 cm 2/3/2020 10:21 AM   Post-Procedure Surface Area (cm^2) 0.7 cm^2 2/3/2020 10:21 AM   Post-Procedure Volume (cm^3) 0.7 cm^3 2/3/2020 10:21 AM   Wound Assessment Drainage 2/3/2020 10:21 AM   Drainage Amount Moderate 2/3/2020 10:21 AM   Drainage Description Serosanguinous 2/3/2020 10:21 AM   Odor None 2/3/2020 10:21 AM   Margins Defined edges 2/3/2020 10:21 AM   Mala-wound Assessment Intact; Clean 2/3/2020 10:21 AM   [place]%Wound Bed 0 1/27/2020 10:51 AM   Red%Wound Bed 100 2/3/2020 10:21 AM   Yellow%Wound Bed 0 1/27/2020 10:51 AM   Number of days: 75       Wound 12/04/19 Abdomen Medial;Upper;Proximal Wound # 4 (Active)   Wound Image   1/20/2020  9:10 AM   Wound Non-Healing Surgical 2/3/2020 10:21 AM   Dressing Status New drainage; Old drainage; Changed 2/3/2020 10:21 AM   Dressing Changed Changed/New 2/3/2020 10:21 AM   Wound Cleansed Rinsed/Irrigated with saline 2/3/2020 10:21 AM   Wound Length (cm) 0.3 cm 2/3/2020 10:21 AM   Wound Width (cm) 0.8 cm 2/3/2020 10:21 AM   Wound Depth (cm) 1.5 cm 2/3/2020 10:21 AM   Wound Surface Area (cm^2) 0.24 cm^2 2/3/2020 10:21 AM   Change in Wound Size % (l*w) 20 2/3/2020 10:21 AM   Wound Volume (cm^3) 0.36 cm^3 2/3/2020 10:21 AM   Wound Healing % 66 2/3/2020 10:21 AM   Post-Procedure Length (cm) 0.3 cm 2/3/2020 10:21 AM   Post-Procedure Width (cm) 0.8 cm 2/3/2020 10:21 AM   Post-Procedure Depth (cm) 1.5 cm 2/3/2020 10:21 AM   Post-Procedure Surface Area (cm^2) 0.24 cm^2 2/3/2020 10:21 AM   Post-Procedure Volume (cm^3) 0.36 cm^3 2/3/2020 10:21 AM   Distance Tunneling (cm) 4.4 cm 1/27/2020 10:51 AM   Tunneling Position ___ O'Clock 11:00 1/27/2020 10:51 AM   Wound Assessment Drainage 2/3/2020 10:21 AM   Drainage Amount Moderate 2/3/2020 10:21 AM   Drainage Description Serosanguinous 2/3/2020 10:21 AM   Odor None 2/3/2020 10:21 AM   Margins Defined edges 2/3/2020 10:21 AM   Mala-wound Assessment Clean; Intact 2/3/2020 10:21 AM   Red%Wound Bed 100 2/3/2020 10:21 AM   Number of days: 61          Percent of Wound(s)/Ulcer(s) Debrided: 100%    Total Surface Area Debrided:  0.9 sq cm         Estimated Blood Loss:  Minimal    Hemostasis Achieved:  by pressure    Procedural Pain:  0  / 10     Post Procedural Pain:  0 / 10     Response to treatment:  Well tolerated by patient. Plan:   Monitor off antibiotics  Patient had follow-up with her oncologist tomorrow was advised to notify him about her left cervical lymphadenopathy and back pain. Treatment Note please see attached Discharge Instructions    Written patient dismissal instructions given to patient and signed by patient or POA. Discharge Instructions         [address] [place] [address] and HYPERBARIC TREATMENT  CENTER                                 Visit Carol Instructions / Physician Orders  DATE:  02/03/2020     Home Care: n/a     SUPPLIES ORDERED THRU: [facility]     Wound Location:  Mid and upper abdomen     Cleanse with: Liquid antibacterial soap and water, rinse well      Dressing Orders:  Silvercel rope to wounds, cover with ABD, secure with mefix tape.  (May pack upper abdominal wound with 1/4\" iodoform if unable to pack with silvercel)     Frequency:  Daily     Additional Orders: Increase protein to diet (meat, cheese, eggs, fish, peanut butter, nuts and beans)  Multivitamin daily  Start drinking boost or ensure  Continue taking Doxycycline- take zofran if needed     HYPERBARIC TREATMENT-                TREATMENT #     Your next appointment with [facility] is in 2 weeks with Dr Buck Garcia   []??????? CHAIR     [x]??????? BED   []??????? EITHER        TIME []??????? 45 MIN     []??????? 60 MIN     (Please note your next appointment above and if you are unable to keep, kindly give a 24 hour notice. Thank you.)     If you experience any of the following, please call [facility] during business hours:  [phone]     * Increase in Pain  * Temperature over 101  * Increase in drainage from your wound  * Drainage with a foul odor  * Bleeding  * Increase in swelling  * Need for compression bandage changes due to slippage, breakthrough drainage.     If you need medical attention outside of the business hours of the [facility] please contact your PCP or go to the nearest emergency room.     The information contained in the After Visit Summary has been reviewed with me, the patient and/or responsible adult, by my health care provider(s). I had the opportunity to ask questions regarding this information. I have elected to receive;      [x]????? ?? After Visit Summary  [x]??????? Comprehensive Discharge Instruction        Patient signature______________________________________Date:________  Electronically signed by Ladarius Ross RN on 2/3/2020 at 10:53 AM          Electronically signed by Junito Padron MD on 2/3/2020 at 10:55 AM

## 2020-02-17 ENCOUNTER — HOSPITAL ENCOUNTER (OUTPATIENT)
Dept: WOUND CARE | Age: 50
Discharge: HOME | End: 2020-02-17
Payer: COMMERCIAL

## 2020-02-17 VITALS
HEIGHT: 63 IN | BODY MASS INDEX: 26.58 KG/M2 | DIASTOLIC BLOOD PRESSURE: 85 MMHG | TEMPERATURE: 99.3 F | RESPIRATION RATE: 16 BRPM | SYSTOLIC BLOOD PRESSURE: 124 MMHG | WEIGHT: 150 LBS | HEART RATE: 102 BPM

## 2020-02-17 PROCEDURE — 11042 DBRDMT SUBQ TIS 1ST 20SQCM/<: CPT | Performed by: NURSE PRACTITIONER

## 2020-02-17 PROCEDURE — 6370000000 HC RX 637 (ALT 250 FOR IP): Performed by: NURSE PRACTITIONER

## 2020-02-17 PROCEDURE — 99203 OFFICE O/P NEW LOW 30 MIN: CPT | Performed by: NURSE PRACTITIONER

## 2020-02-17 PROCEDURE — 11042 DBRDMT SUBQ TIS 1ST 20SQCM/<: CPT

## 2020-02-17 RX ORDER — LIDOCAINE HYDROCHLORIDE 40 MG/ML
SOLUTION TOPICAL ONCE
Status: COMPLETED | OUTPATIENT
Start: 2020-02-17 | End: 2020-02-17

## 2020-02-17 RX ORDER — PANTOPRAZOLE SODIUM 40 MG/1
40 TABLET, DELAYED RELEASE ORAL DAILY
COMMUNITY

## 2020-02-17 RX ADMIN — LIDOCAINE HYDROCHLORIDE 5 ML: 40 SOLUTION TOPICAL at 10:48

## 2020-02-17 ASSESSMENT — PAIN DESCRIPTION - PAIN TYPE: TYPE: CHRONIC PAIN

## 2020-02-17 ASSESSMENT — PAIN SCALES - GENERAL: PAINLEVEL_OUTOF10: 0

## 2020-02-17 ASSESSMENT — PAIN DESCRIPTION - LOCATION: LOCATION: ABDOMEN

## 2020-02-17 NOTE — PROGRESS NOTES
attempt to quit: 2012     Years since quittin.2    Smokeless tobacco: Never Used   Substance Use Topics    Alcohol use: No    Drug use: No       ALLERGIES    Allergies   Allergen Reactions    Augmentin [Amoxicillin-Pot Clavulanate] Hives       MEDICATIONS    Current Outpatient Medications on File Prior to Encounter   Medication Sig Dispense Refill    pantoprazole (PROTONIX) 40 MG tablet Take 40 mg by mouth daily      ondansetron (ZOFRAN-ODT) 8 MG TBDP disintegrating tablet Place 8 mg under the tongue 3 times daily (before meals)      oxyCODONE-acetaminophen (PERCOCET) 5-325 MG per tablet Take 1 tablet by mouth daily as needed for Pain.  LORazepam (ATIVAN) 0.5 MG tablet Take 0.5 mg by mouth every 8 hours as needed for Anxiety.  promethazine (PHENERGAN) 12.5 MG tablet Take 12.5 mg by mouth every 6 hours as needed for Nausea      diazepam (VALIUM) 2 MG tablet Take 2 mg by mouth every 8 hours as needed for Anxiety.  loperamide (IMODIUM) 2 MG capsule Take 2 mg by mouth 4 times daily (with meals and nightly)      traMADol (ULTRAM) 50 MG tablet Take 50 mg by mouth every 8 hours as needed for Pain.  Cholecalciferol (VITAMIN D3) 2000 units CAPS Take 1 capsule by mouth daily  3    ferrous gluconate (FERGON) 324 (38 Fe) MG tablet Take 324 mg by mouth daily (with breakfast)      lisinopril (PRINIVIL;ZESTRIL) 2.5 MG tablet Take 2.5 mg by mouth daily Not taking      spironolactone (ALDACTONE) 25 MG tablet Take 25 mg by mouth daily      metoprolol succinate (TOPROL XL) 25 MG extended release tablet 1/2 tablet once daily       No current facility-administered medications on file prior to encounter. REVIEW OF SYSTEMS    Pertinent items are noted in HPI.     Objective:      /85   Pulse 102   Temp 99.3 °F (37.4 °C) (Tympanic)   Resp 16   Ht 5' 3\" (1.6 m)   Wt 150 lb (68 kg)   BMI 26.57 kg/m²     Wt Readings from Last 3 Encounters:   20 150 lb (68 kg)   20 153 lb (69.4 kg)   01/27/20 153 lb (69.4 kg)       PHYSICAL EXAM    General Appearance: alert and oriented to person, place and time, well-developed and obese, in no acute distress  Skin: warm and dry, no rash or erythema, midline abdominal wall wound   Head: normocephalic and atraumatic  Eyes: pupils equal, round, and conjunctivae normal  Pulmonary/Chest: normal air movement, no respiratory distress  Extremities: no cyanosis and no clubbing  Musculoskeletal: no joint swelling, deformity or tenderness  Neurologic: gait and coordination normal and speech normal      Assessment:     Problem List Items Addressed This Visit     Abdominal wound dehiscence - Primary (Chronic)    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 Performed by: STEPHANIE Damon CNP    Consent obtained:  Yes    Time out taken:  Yes    Pain Control:         Debridement:Excisional Debridement    Using curette the wound(s)/ulcer(s) was/were sharply debrided down through and including the removal of subcutaneous tissue. Devitalized Tissue Debrided:  fibrin, biofilm and slough    Pre Debridement Measurements:  Are located in the [facility]  Documentation Flow Sheet    Wound/Ulcer #: 1    Post Debridement Measurements:  Wound/Ulcer Descriptions are Pre Debridement except measurements:    Incision 07/06/15 Chest Left;Upper (Active)   Number of days: 9445       Incision 01/05/18 Chest Right (Active)   Number of days: 784       Wound 11/20/19 Abdomen Mid;Lower #1 (Active)   Wound Image   1/20/2020  9:10 AM   Wound Non-Healing Surgical 2/17/2020 10:38 AM   Dressing Status New drainage; Old drainage; Changed 2/17/2020 10:38 AM Dressing Changed Changed/New 2/17/2020 10:38 AM   Wound Cleansed Rinsed/Irrigated with saline 2/17/2020 10:38 AM   Wound Length (cm) 0.2 cm 2/17/2020 10:38 AM   Wound Width (cm) 0.3 cm 2/17/2020 10:38 AM   Wound Depth (cm) 0.1 cm 2/17/2020 10:38 AM   Wound Surface Area (cm^2) 0.06 cm^2 2/17/2020 10:38 AM   Change in Wound Size % (l*w) 99.4 2/17/2020 10:38 AM   Wound Volume (cm^3) 0.01 cm^3 2/17/2020 10:38 AM   Wound Healing % 100 2/17/2020 10:38 AM   Post-Procedure Length (cm) 0.2 cm 2/17/2020 10:38 AM   Post-Procedure Width (cm) 0.3 cm 2/17/2020 10:38 AM   Post-Procedure Depth (cm) 0.1 cm 2/17/2020 10:38 AM   Post-Procedure Surface Area (cm^2) 0.06 cm^2 2/17/2020 10:38 AM   Post-Procedure Volume (cm^3) 0.01 cm^3 2/17/2020 10:38 AM   Wound Assessment Drainage;Pratt 2/17/2020 10:38 AM   Drainage Amount Small 2/17/2020 10:38 AM   Drainage Description Serosanguinous 2/3/2020 10:21 AM   Odor None 2/17/2020 10:38 AM   Margins Defined edges 2/17/2020 10:38 AM   Mala-wound Assessment Intact; Clean 2/17/2020 10:38 AM   Pratt%Wound Bed 100 2/17/2020 10:38 AM   Red%Wound Bed 0 2/17/2020 10:38 AM   Yellow%Wound Bed 0 2/17/2020 10:38 AM   Number of days: 89       Wound 12/04/19 Abdomen Medial;Upper;Proximal Wound # 4 (Active)   Wound Image   1/20/2020  9:10 AM   Wound Non-Healing Surgical 2/17/2020 10:38 AM   Dressing Status New drainage; Old drainage; Changed 2/17/2020 10:38 AM   Dressing Changed Changed/New 2/17/2020 10:38 AM   Wound Cleansed Rinsed/Irrigated with saline 2/17/2020 10:38 AM   Wound Length (cm) 0 cm 2/17/2020 10:38 AM   Wound Width (cm) 0 cm 2/17/2020 10:38 AM   Wound Depth (cm) 0 cm 2/17/2020 10:38 AM   Wound Surface Area (cm^2) 0 cm^2 2/17/2020 10:38 AM   Change in Wound Size % (l*w) 100 2/17/2020 10:38 AM   Wound Volume (cm^3) 0 cm^3 2/17/2020 10:38 AM   Wound Healing % 100 2/17/2020 10:38 AM   Post-Procedure Length (cm) 0 cm 2/17/2020 10:38 AM   Post-Procedure Width (cm) 0 cm 2/17/2020 10:38 AM   []???????? EITHER        TIME []???????? 45 MIN     []???????? 60 MIN     (Please note your next appointment above and if you are unable to keep, kindly give a 24 hour notice. Thank you.)     If you experience any of the following, please call the [facility] during business hours:  [phone]     * Increase in Pain  * Temperature over 101  * Increase in drainage from your wound  * Drainage with a foul odor  * Bleeding  * Increase in swelling  * Need for compression bandage changes due to slippage, breakthrough drainage.     If you need medical attention outside of the business hours of the [facility] please contact your PCP or go to the nearest emergency room.     The information contained in the After Visit Summary has been reviewed with me, the patient and/or responsible adult, by my health care provider(s). I had the opportunity to ask questions regarding this information. I have elected to receive;      [x]?????? ?? After Visit Summary  [x]???????? Comprehensive Discharge Instruction        Patient signature______________________________________Date:________  Electronically signed by Dory Cary RN on 2/17/2020 at 11:11 AM          Electronically signed by STEPHANIE Ley CNP on 2/17/2020 at 11:14 AM

## 2020-02-26 ENCOUNTER — TELEPHONE (OUTPATIENT)
Dept: WOUND CARE | Age: 50
End: 2020-02-26

## (undated) DEVICE — CANNULA NSL AD TBNG L7FT PVC STR NONFLARED PRNG O2 DEL W STD

## (undated) DEVICE — SINGLE PORT MANIFOLD: Brand: NEPTUNE 2

## (undated) DEVICE — PENCIL ES L3M BTTN SWCH HOLSTER W/ BLDE ELECTRD EDGE

## (undated) DEVICE — Z INACTIVE USE 2653177 SPONGE GZ W2XL2IN NONWOVEN 4 PLY FASTER WICKING ABIL AVANT

## (undated) DEVICE — DECANTER FLD 9IN ST BG FOR ASEP TRNSF OF FLD

## (undated) DEVICE — GLOVE SURG SZ 65 THK91MIL LTX FREE SYN POLYISOPRENE

## (undated) DEVICE — Z DISCONTINUED USE 2424143 ADAPTER O2 SWVL CHRISTMAS TREE GRN

## (undated) DEVICE — INTENDED FOR TISSUE SEPARATION, AND OTHER PROCEDURES THAT REQUIRE A SHARP SURGICAL BLADE TO PUNCTURE OR CUT.: Brand: BARD-PARKER ® CARBON RIB-BACK BLADES

## (undated) DEVICE — 3M™ TEGADERM™ TRANSPARENT FILM DRESSING FRAME STYLE, 1624W, 2-3/8 IN X 2-3/4 IN (6 CM X 7 CM), 100/CT 4CT/CASE: Brand: 3M™ TEGADERM™

## (undated) DEVICE — JELLY,LUBE,STERILE,FLIP TOP,TUBE,2-OZ: Brand: MEDLINE

## (undated) DEVICE — SUTURE VCRL + SZ 4-0 L27IN ABSRB WHT FS-2 3/8 CIR REV CUT VCP422H

## (undated) DEVICE — ST CHARLES PORT PACK: Brand: MEDLINE INDUSTRIES, INC.

## (undated) DEVICE — NEEDLE ANGIO 18GA L6.98CM ART ENTRY W/O STYL 1 PART PERC

## (undated) DEVICE — POLYP TRAP: Brand: TRAPEASE®

## (undated) DEVICE — SUTURE VCRL + SZ 3-0 L27IN ABSRB UD L26MM SH 1/2 CIR VCP416H

## (undated) DEVICE — STRIP,CLOSURE,WOUND,MEDI-STRIP,1/2X4: Brand: MEDLINE

## (undated) DEVICE — SUTURE PROL SZ 2-0 L30IN NONABSORBABLE BLU L26MM CT-2 1/2 8411H

## (undated) DEVICE — COVER,MAYO STAND,STERILE: Brand: MEDLINE

## (undated) DEVICE — Z DUPLICATE USE 2527422 TUBING O2 STD 7 FT EXTN NO CRUSH VISUAL CNTRST LF

## (undated) DEVICE — SOLUTION IV 1000ML 0.9% SOD CHL PH 5 INJ USP VIAFLX PLAS

## (undated) DEVICE — SNARE ENDOSCP L240CM LOOP W13MM DIA2.4MM SHT THROW SM OVL

## (undated) DEVICE — FORCEPS BX L240CM JAW DIA2.8MM L CAP W/ NDL MIC MESH TOOTH

## (undated) DEVICE — HYPODERMIC SAFETY NEEDLE: Brand: MAGELLAN

## (undated) DEVICE — Device

## (undated) DEVICE — GOWN,POLY REINFORCED,LG: Brand: MEDLINE

## (undated) DEVICE — 3M™ TEGADERM™ TRANSPARENT FILM DRESSING FRAME STYLE, 1626W, 4 IN X 4-3/4 IN (10 CM X 12 CM), 50/CT 4CT/CASE: Brand: 3M™ TEGADERM™

## (undated) DEVICE — DEFENDO AIR WATER SUCTION AND BIOPSY VALVE KIT FOR  OLYMPUS: Brand: DEFENDO AIR/WATER/SUCTION AND BIOPSY VALVE

## (undated) DEVICE — GLOVE SURG SZ 65 STD WHT LTX SYN POLYMER BEAD REINF ANTI RL

## (undated) DEVICE — Z INACTIVE USE 2525529 CONNECTOR TBNG FOR O2

## (undated) DEVICE — ERBE NESSY® OMEGA PLATE USA (85+23)CM² , WITH CABLE 3 M: Brand: ERBE